# Patient Record
Sex: FEMALE | Race: BLACK OR AFRICAN AMERICAN | NOT HISPANIC OR LATINO
[De-identification: names, ages, dates, MRNs, and addresses within clinical notes are randomized per-mention and may not be internally consistent; named-entity substitution may affect disease eponyms.]

---

## 2017-06-14 ENCOUNTER — TRANSCRIPTION ENCOUNTER (OUTPATIENT)
Age: 47
End: 2017-06-14

## 2018-07-25 ENCOUNTER — EMERGENCY (EMERGENCY)
Facility: HOSPITAL | Age: 48
LOS: 1 days | Discharge: HOME | End: 2018-07-25
Attending: EMERGENCY MEDICINE | Admitting: EMERGENCY MEDICINE
Payer: COMMERCIAL

## 2018-07-25 VITALS
SYSTOLIC BLOOD PRESSURE: 149 MMHG | HEART RATE: 77 BPM | TEMPERATURE: 98 F | OXYGEN SATURATION: 97 % | DIASTOLIC BLOOD PRESSURE: 101 MMHG | RESPIRATION RATE: 18 BRPM

## 2018-07-25 VITALS
TEMPERATURE: 98 F | DIASTOLIC BLOOD PRESSURE: 95 MMHG | OXYGEN SATURATION: 97 % | SYSTOLIC BLOOD PRESSURE: 161 MMHG | WEIGHT: 259.93 LBS | RESPIRATION RATE: 18 BRPM | HEIGHT: 71 IN | HEART RATE: 94 BPM

## 2018-07-25 DIAGNOSIS — R07.89 OTHER CHEST PAIN: ICD-10-CM

## 2018-07-25 DIAGNOSIS — I26.99 OTHER PULMONARY EMBOLISM WITHOUT ACUTE COR PULMONALE: ICD-10-CM

## 2018-07-25 LAB
ALBUMIN SERPL ELPH-MCNC: 4 G/DL — SIGNIFICANT CHANGE UP (ref 3.3–5)
ALP SERPL-CCNC: 57 U/L — SIGNIFICANT CHANGE UP (ref 40–120)
ALT FLD-CCNC: 18 U/L — SIGNIFICANT CHANGE UP (ref 10–45)
ANION GAP SERPL CALC-SCNC: 13 MMOL/L — SIGNIFICANT CHANGE UP (ref 5–17)
APTT BLD: 29.8 SEC — SIGNIFICANT CHANGE UP (ref 27.5–37.4)
AST SERPL-CCNC: 28 U/L — SIGNIFICANT CHANGE UP (ref 10–40)
BASOPHILS NFR BLD AUTO: 1.1 % — SIGNIFICANT CHANGE UP (ref 0–2)
BILIRUB SERPL-MCNC: 0.5 MG/DL — SIGNIFICANT CHANGE UP (ref 0.2–1.2)
BUN SERPL-MCNC: 13 MG/DL — SIGNIFICANT CHANGE UP (ref 7–23)
CALCIUM SERPL-MCNC: 9.6 MG/DL — SIGNIFICANT CHANGE UP (ref 8.4–10.5)
CHLORIDE SERPL-SCNC: 102 MMOL/L — SIGNIFICANT CHANGE UP (ref 96–108)
CK MB CFR SERPL CALC: 1.3 NG/ML — SIGNIFICANT CHANGE UP (ref 0–6.7)
CK SERPL-CCNC: 101 U/L — SIGNIFICANT CHANGE UP (ref 25–170)
CO2 SERPL-SCNC: 26 MMOL/L — SIGNIFICANT CHANGE UP (ref 22–31)
CREAT SERPL-MCNC: 0.75 MG/DL — SIGNIFICANT CHANGE UP (ref 0.5–1.3)
D DIMER BLD IA.RAPID-MCNC: 339 NG/ML DDU — HIGH
EOSINOPHIL NFR BLD AUTO: 4.8 % — SIGNIFICANT CHANGE UP (ref 0–6)
GLUCOSE SERPL-MCNC: 112 MG/DL — HIGH (ref 70–99)
HCT VFR BLD CALC: 40.9 % — SIGNIFICANT CHANGE UP (ref 34.5–45)
HGB BLD-MCNC: 11.9 G/DL — SIGNIFICANT CHANGE UP (ref 11.5–15.5)
INR BLD: 0.98 — SIGNIFICANT CHANGE UP (ref 0.88–1.16)
LYMPHOCYTES # BLD AUTO: 35.1 % — SIGNIFICANT CHANGE UP (ref 13–44)
MCHC RBC-ENTMCNC: 21.8 PG — LOW (ref 27–34)
MCHC RBC-ENTMCNC: 29.1 G/DL — LOW (ref 32–36)
MCV RBC AUTO: 74.9 FL — LOW (ref 80–100)
MONOCYTES NFR BLD AUTO: 12.7 % — SIGNIFICANT CHANGE UP (ref 2–14)
NEUTROPHILS NFR BLD AUTO: 46.3 % — SIGNIFICANT CHANGE UP (ref 43–77)
PLATELET # BLD AUTO: 189 K/UL — SIGNIFICANT CHANGE UP (ref 150–400)
POTASSIUM SERPL-MCNC: 4.1 MMOL/L — SIGNIFICANT CHANGE UP (ref 3.5–5.3)
POTASSIUM SERPL-SCNC: 4.1 MMOL/L — SIGNIFICANT CHANGE UP (ref 3.5–5.3)
PROT SERPL-MCNC: 7.6 G/DL — SIGNIFICANT CHANGE UP (ref 6–8.3)
PROTHROM AB SERPL-ACNC: 10.9 SEC — SIGNIFICANT CHANGE UP (ref 9.8–12.7)
RBC # BLD: 5.46 M/UL — HIGH (ref 3.8–5.2)
RBC # FLD: 17.4 % — HIGH (ref 10.3–16.9)
SODIUM SERPL-SCNC: 141 MMOL/L — SIGNIFICANT CHANGE UP (ref 135–145)
TROPONIN T SERPL-MCNC: <0.01 NG/ML — SIGNIFICANT CHANGE UP (ref 0–0.01)
WBC # BLD: 4.4 K/UL — SIGNIFICANT CHANGE UP (ref 3.8–10.5)
WBC # FLD AUTO: 4.4 K/UL — SIGNIFICANT CHANGE UP (ref 3.8–10.5)

## 2018-07-25 PROCEDURE — 85379 FIBRIN DEGRADATION QUANT: CPT

## 2018-07-25 PROCEDURE — 85025 COMPLETE CBC W/AUTO DIFF WBC: CPT

## 2018-07-25 PROCEDURE — 99284 EMERGENCY DEPT VISIT MOD MDM: CPT | Mod: 25

## 2018-07-25 PROCEDURE — 71275 CT ANGIOGRAPHY CHEST: CPT | Mod: 26

## 2018-07-25 PROCEDURE — 71275 CT ANGIOGRAPHY CHEST: CPT

## 2018-07-25 PROCEDURE — 84484 ASSAY OF TROPONIN QUANT: CPT

## 2018-07-25 PROCEDURE — 85730 THROMBOPLASTIN TIME PARTIAL: CPT

## 2018-07-25 PROCEDURE — 82550 ASSAY OF CK (CPK): CPT

## 2018-07-25 PROCEDURE — 82553 CREATINE MB FRACTION: CPT

## 2018-07-25 PROCEDURE — 85610 PROTHROMBIN TIME: CPT

## 2018-07-25 PROCEDURE — 80053 COMPREHEN METABOLIC PANEL: CPT

## 2018-07-25 PROCEDURE — 99284 EMERGENCY DEPT VISIT MOD MDM: CPT

## 2018-07-25 PROCEDURE — 71275 CT ANGIOGRAPHY CHEST: CPT | Mod: 26,77

## 2018-07-25 RX ORDER — RIVAROXABAN 15 MG-20MG
15 KIT ORAL ONCE
Qty: 0 | Refills: 0 | Status: COMPLETED | OUTPATIENT
Start: 2018-07-25 | End: 2018-07-25

## 2018-07-25 RX ORDER — RIVAROXABAN 15 MG-20MG
1 KIT ORAL
Qty: 42 | Refills: 0
Start: 2018-07-25 | End: 2018-08-14

## 2018-07-25 RX ORDER — FONDAPARINUX SODIUM 2.5 MG/.5ML
1 INJECTION, SOLUTION SUBCUTANEOUS
Qty: 42 | Refills: 0 | OUTPATIENT
Start: 2018-07-25 | End: 2018-08-14

## 2018-07-25 RX ADMIN — RIVAROXABAN 15 MILLIGRAM(S): KIT at 19:57

## 2018-07-25 NOTE — ED ADULT NURSE NOTE - OBJECTIVE STATEMENT
48 y/o female w/ hx of PE 2 years ago, not currently on blood thinners, c/o intermittent left sided anterior chest wall pain that began 2 weeks ago associated w/ SOB. Pt reports SOB only when it is hot out. Pt was prescribed Xarelto for 1 year. Denies fever, chills, cough, palpitations, calf pain, back pain, headache, neck pain, and any other associated symptoms.

## 2018-07-25 NOTE — ED PROVIDER NOTE - PROGRESS NOTE DETAILS
discussed second trop advise, pt does not want to stay for this and understands risk of missed MI, + small peripheral PE, will  initiate xarelto , discussed this plan with Dr. Maya,

## 2018-07-25 NOTE — ED PROVIDER NOTE - MEDICAL DECISION MAKING DETAILS
48 yo with atypical CP, due to ho of PE will CT to evaluate for this , highly doubt ACS given nature of pain and EKG wnl, if CT ok will advise f/u with pcp . highest suspicion pain is musculoskeletal.

## 2018-07-25 NOTE — ED PROVIDER NOTE - OBJECTIVE STATEMENT
chest pain  48 yo with with C.P. for the last 3 days, occasional pinch/ sharp sensation on L chest, lasting a few seconds, felt SOB when it was hot out yesterday, no SOB now and it is not exertional , no palpitations, no radiation of pain, currently pain free, FH of CAD, personal ho of PE 2 yrs ago after knee surgery. Patient called her hematologist Dr. Maya who advised she come to ER for evaluation, no recent travel, no hormones, no trauma

## 2019-02-10 ENCOUNTER — TRANSCRIPTION ENCOUNTER (OUTPATIENT)
Age: 49
End: 2019-02-10

## 2019-06-07 ENCOUNTER — RX RENEWAL (OUTPATIENT)
Age: 49
End: 2019-06-07

## 2019-06-07 PROBLEM — I26.99 OTHER PULMONARY EMBOLISM WITHOUT ACUTE COR PULMONALE: Chronic | Status: ACTIVE | Noted: 2018-07-25

## 2019-06-07 PROBLEM — Z00.00 ENCOUNTER FOR PREVENTIVE HEALTH EXAMINATION: Status: ACTIVE | Noted: 2019-06-07

## 2019-06-07 RX ORDER — HYLAN G-F 20 16MG/2ML
16 SYRINGE (ML) INTRAARTICULAR
Qty: 6 | Refills: 0 | Status: ACTIVE | OUTPATIENT
Start: 2019-06-07

## 2019-06-10 ENCOUNTER — APPOINTMENT (OUTPATIENT)
Dept: ORTHOPEDIC SURGERY | Facility: CLINIC | Age: 49
End: 2019-06-10

## 2019-06-28 ENCOUNTER — OTHER (OUTPATIENT)
Age: 49
End: 2019-06-28

## 2019-07-09 ENCOUNTER — OTHER (OUTPATIENT)
Age: 49
End: 2019-07-09

## 2019-08-07 ENCOUNTER — APPOINTMENT (OUTPATIENT)
Dept: ORTHOPEDIC SURGERY | Facility: CLINIC | Age: 49
End: 2019-08-07
Payer: COMMERCIAL

## 2019-08-07 PROCEDURE — 20611 DRAIN/INJ JOINT/BURSA W/US: CPT | Mod: 50

## 2019-08-07 PROCEDURE — 99204 OFFICE O/P NEW MOD 45 MIN: CPT | Mod: 25

## 2019-08-07 NOTE — PHYSICAL EXAM
[de-identified] : Right knee exam today definite medial joint line tenderness. Range of motion is 0-125°. There is no effusion the knee is stable.\par \par Left knee exam today definite medial joint tenderness range of motion 0-130°. There is no effusion noted today patient is

## 2019-08-12 ENCOUNTER — APPOINTMENT (OUTPATIENT)
Dept: ORTHOPEDIC SURGERY | Facility: CLINIC | Age: 49
End: 2019-08-12
Payer: COMMERCIAL

## 2019-08-12 PROCEDURE — 20611 DRAIN/INJ JOINT/BURSA W/US: CPT | Mod: 50

## 2019-08-12 PROCEDURE — 99214 OFFICE O/P EST MOD 30 MIN: CPT | Mod: 25

## 2019-08-12 NOTE — PHYSICAL EXAM
[de-identified] : Right knee exam today definite medial joint line tenderness. Range of motion is 0-125°. There is no effusion the knee is stable.\par \par Left knee exam today definite medial joint tenderness range of motion 0-130°. There is no effusion noted today patient is

## 2019-08-12 NOTE — PROCEDURE
[de-identified] : Patient was given the second of the series of 3 bilateral Synvisc injections today. Patient tolerated procedure well. Injections were done under sterile conditions and ultrasound guidance.

## 2019-08-19 ENCOUNTER — APPOINTMENT (OUTPATIENT)
Dept: ORTHOPEDIC SURGERY | Facility: CLINIC | Age: 49
End: 2019-08-19
Payer: COMMERCIAL

## 2019-08-19 PROCEDURE — 99214 OFFICE O/P EST MOD 30 MIN: CPT | Mod: 25

## 2019-08-19 PROCEDURE — 20611 DRAIN/INJ JOINT/BURSA W/US: CPT | Mod: 50

## 2019-08-19 NOTE — PROCEDURE
[de-identified] : Patient was given the third of the series of 3 bilateral Synvisc injections today. Patient tolerated procedure well. Injections were done under sterile conditions and ultrasound guidance.

## 2019-08-19 NOTE — PHYSICAL EXAM
[de-identified] : Right knee exam today definite medial joint line tenderness. Range of motion is 0-125°. There is no effusion the knee is stable.\par \par Left knee exam today definite medial joint tenderness range of motion 0-130°. There is no effusion noted today patient is

## 2020-05-04 ENCOUNTER — APPOINTMENT (OUTPATIENT)
Dept: ORTHOPEDIC SURGERY | Facility: CLINIC | Age: 50
End: 2020-05-04

## 2020-05-04 RX ORDER — HYALURONATE SODIUM 30 MG/2 ML
30 SYRINGE (ML) INTRAARTICULAR
Qty: 6 | Refills: 0 | Status: ACTIVE | OUTPATIENT
Start: 2020-05-04

## 2020-05-21 ENCOUNTER — APPOINTMENT (OUTPATIENT)
Dept: ORTHOPEDIC SURGERY | Facility: CLINIC | Age: 50
End: 2020-05-21
Payer: COMMERCIAL

## 2020-05-21 PROCEDURE — 99214 OFFICE O/P EST MOD 30 MIN: CPT | Mod: 25

## 2020-05-21 PROCEDURE — 20611 DRAIN/INJ JOINT/BURSA W/US: CPT | Mod: 50

## 2020-05-21 NOTE — HISTORY OF PRESENT ILLNESS
[de-identified] : pt is here for BL cortisone injections as she waits for arrival of Orthovisc. Pt states shes having pain especially with standing long periods at her job- .

## 2020-05-21 NOTE — PHYSICAL EXAM
[de-identified] : Right knee exam today definite medial joint line tenderness. Range of motion is 0-125°. There is no effusion the knee is stable.\par \par Left knee exam today definite medial joint tenderness range of motion 0-130°. There is no effusion noted today patient is

## 2020-05-21 NOTE — PROCEDURE
[de-identified] : Patient was given a cortisone injection (Lidocaine 1% 4 cc + 10 mg of Kenalog) in the lateral compartment of the BL knee. Injection is performed under sterile conditions with ultrasound guidance. Patient tolerated procedure well. Patient has a history of insulin- dependant diabetes and was informed of possible increase of blood glucose levels and instructed to adjust insulin accordingly.\par \par \par \par

## 2020-07-23 ENCOUNTER — APPOINTMENT (OUTPATIENT)
Dept: ORTHOPEDIC SURGERY | Facility: CLINIC | Age: 50
End: 2020-07-23
Payer: COMMERCIAL

## 2020-07-23 VITALS — TEMPERATURE: 96.9 F

## 2020-07-23 PROCEDURE — 99214 OFFICE O/P EST MOD 30 MIN: CPT | Mod: 25

## 2020-07-23 PROCEDURE — 20611 DRAIN/INJ JOINT/BURSA W/US: CPT | Mod: 50

## 2020-07-23 NOTE — PROCEDURE
[de-identified] : Patient was given the First of a series of 3 Orthovisc injections today. Patient tolerated procedure well. Injections were done under sterile conditions and ultrasound guidance.

## 2020-07-23 NOTE — PHYSICAL EXAM
[de-identified] : Right knee exam today definite medial joint line tenderness. Range of motion is 0-125°. There is no effusion the knee is stable.\par \par Left knee exam today definite medial joint tenderness range of motion 0-130°. There is no effusion noted today patient is

## 2020-07-30 ENCOUNTER — APPOINTMENT (OUTPATIENT)
Dept: ORTHOPEDIC SURGERY | Facility: CLINIC | Age: 50
End: 2020-07-30
Payer: COMMERCIAL

## 2020-07-30 VITALS — TEMPERATURE: 96.1 F

## 2020-07-30 PROCEDURE — 99214 OFFICE O/P EST MOD 30 MIN: CPT | Mod: 25

## 2020-07-30 PROCEDURE — 20611 DRAIN/INJ JOINT/BURSA W/US: CPT | Mod: 50

## 2020-07-30 NOTE — HISTORY OF PRESENT ILLNESS
[de-identified] : Patient returns today she is here for a second of 3 bilateral Orthovisc injections.

## 2020-07-30 NOTE — PHYSICAL EXAM
[de-identified] : Right knee exam today definite medial joint line tenderness. Range of motion is 0-125°. There is no effusion the knee is stable.\par \par Left knee exam today definite medial joint tenderness range of motion 0-130°. There is no effusion noted today patient is

## 2020-07-30 NOTE — PROCEDURE
[de-identified] : Patient was given the second of a series of 3 Orthovisc injections today. Patient tolerated procedure well. Injections were done under sterile conditions and ultrasound guidance.

## 2020-08-06 ENCOUNTER — APPOINTMENT (OUTPATIENT)
Dept: ORTHOPEDIC SURGERY | Facility: CLINIC | Age: 50
End: 2020-08-06
Payer: COMMERCIAL

## 2020-08-06 VITALS — TEMPERATURE: 96.3 F

## 2020-08-06 PROCEDURE — 99214 OFFICE O/P EST MOD 30 MIN: CPT | Mod: 25

## 2020-08-06 PROCEDURE — 20611 DRAIN/INJ JOINT/BURSA W/US: CPT | Mod: 50

## 2020-08-06 NOTE — PHYSICAL EXAM
[de-identified] : Right knee exam today definite medial joint line tenderness. Range of motion is 0-125°. There is no effusion the knee is stable.\par \par Left knee exam today definite medial joint tenderness range of motion 0-130°. There is no effusion noted today patient is

## 2020-08-06 NOTE — HISTORY OF PRESENT ILLNESS
[de-identified] : Patient is here for the third and final bilateral Orthovisc injection today. She'll reveals moderate improvement.

## 2020-08-06 NOTE — PROCEDURE
[de-identified] : Patient was given the third of a series of 3 Orthovisc injections today. Patient tolerated procedure well. Injections were done under sterile conditions and ultrasound guidance.

## 2020-09-21 NOTE — PROCEDURE
[de-identified] : Patient was given the first of the series of 3 bilateral Synvisc injections today. Patient tolerated procedure well. Injections were done under sterile conditions and ultrasound guidance.
Yes

## 2021-02-03 ENCOUNTER — TRANSCRIPTION ENCOUNTER (OUTPATIENT)
Age: 51
End: 2021-02-03

## 2021-02-19 RX ORDER — HYALURONATE SODIUM 30 MG/2 ML
30 SYRINGE (ML) INTRAARTICULAR
Qty: 6 | Refills: 0 | Status: ACTIVE | OUTPATIENT
Start: 2021-02-19

## 2021-02-22 ENCOUNTER — APPOINTMENT (OUTPATIENT)
Dept: ORTHOPEDIC SURGERY | Facility: CLINIC | Age: 51
End: 2021-02-22

## 2021-04-05 ENCOUNTER — APPOINTMENT (OUTPATIENT)
Dept: ORTHOPEDIC SURGERY | Facility: CLINIC | Age: 51
End: 2021-04-05
Payer: COMMERCIAL

## 2021-04-05 PROCEDURE — 99214 OFFICE O/P EST MOD 30 MIN: CPT | Mod: 25

## 2021-04-05 PROCEDURE — 99072 ADDL SUPL MATRL&STAF TM PHE: CPT

## 2021-04-05 PROCEDURE — 20611 DRAIN/INJ JOINT/BURSA W/US: CPT | Mod: 50

## 2021-04-05 RX ORDER — LEVOCETIRIZINE DIHYDROCHLORIDE 5 MG/1
5 TABLET ORAL
Qty: 90 | Refills: 0 | Status: ACTIVE | COMMUNITY
Start: 2021-03-18

## 2021-04-05 RX ORDER — IRBESARTAN 300 MG/1
300 TABLET ORAL
Qty: 90 | Refills: 0 | Status: ACTIVE | COMMUNITY
Start: 2021-03-26

## 2021-04-05 RX ORDER — TRIAMCINOLONE ACETONIDE 1 MG/G
0.1 CREAM TOPICAL
Qty: 80 | Refills: 0 | Status: ACTIVE | COMMUNITY
Start: 2021-03-15

## 2021-04-05 RX ORDER — RIVAROXABAN 20 MG/1
20 TABLET, FILM COATED ORAL
Qty: 90 | Refills: 0 | Status: ACTIVE | COMMUNITY
Start: 2021-01-11

## 2021-04-05 RX ORDER — FLUTICASONE PROPIONATE 50 UG/1
50 SPRAY, METERED NASAL
Qty: 16 | Refills: 0 | Status: ACTIVE | COMMUNITY
Start: 2021-03-18

## 2021-04-05 RX ORDER — HYDROCHLOROTHIAZIDE 25 MG/1
25 TABLET ORAL
Qty: 90 | Refills: 0 | Status: ACTIVE | COMMUNITY
Start: 2021-03-23

## 2021-04-05 RX ORDER — FLUOCINONIDE 0.5 MG/G
0.05 CREAM TOPICAL
Qty: 60 | Refills: 0 | Status: ACTIVE | COMMUNITY
Start: 2020-11-19

## 2021-04-05 RX ORDER — CRISABOROLE 20 MG/G
2 OINTMENT TOPICAL
Qty: 60 | Refills: 0 | Status: ACTIVE | COMMUNITY
Start: 2021-03-24

## 2021-04-05 NOTE — HISTORY OF PRESENT ILLNESS
[de-identified] : Patient is here for the first of 3 bilateral Orthovisc injections. Worked well for her in the past.

## 2021-04-05 NOTE — PROCEDURE
[de-identified] : Patient was given the first of a series of 3 Orthovisc injections today. Patient tolerated procedure well. Injections were done under sterile conditions and ultrasound guidance.

## 2021-04-05 NOTE — PHYSICAL EXAM
[de-identified] : Right knee exam today definite medial joint line tenderness. Range of motion is 0-125°. There is no effusion the knee is stable.\par \par Left knee exam today definite medial joint tenderness range of motion 0-130°. There is no effusion noted today patient is

## 2021-04-19 ENCOUNTER — APPOINTMENT (OUTPATIENT)
Dept: ORTHOPEDIC SURGERY | Facility: CLINIC | Age: 51
End: 2021-04-19
Payer: COMMERCIAL

## 2021-04-19 PROCEDURE — 99214 OFFICE O/P EST MOD 30 MIN: CPT | Mod: 25

## 2021-04-19 PROCEDURE — 20611 DRAIN/INJ JOINT/BURSA W/US: CPT | Mod: 50

## 2021-04-19 PROCEDURE — 99072 ADDL SUPL MATRL&STAF TM PHE: CPT

## 2021-04-19 NOTE — PROCEDURE
[de-identified] : Patient was given the second of a series of 3 Orthovisc injections today. Patient tolerated procedure well. Injections were done under sterile conditions and ultrasound guidance.

## 2021-04-19 NOTE — PHYSICAL EXAM
[de-identified] : Right knee exam today definite medial joint line tenderness. Range of motion is 0-125°. There is no effusion the knee is stable.\par \par Left knee exam today definite medial joint tenderness range of motion 0-130°. There is no effusion noted today patient is

## 2021-04-19 NOTE — HISTORY OF PRESENT ILLNESS
[de-identified] : Patient is here for the second of 3 bilateral Orthovisc injections. She already feels moderate improvement

## 2021-04-26 ENCOUNTER — APPOINTMENT (OUTPATIENT)
Dept: ORTHOPEDIC SURGERY | Facility: CLINIC | Age: 51
End: 2021-04-26
Payer: COMMERCIAL

## 2021-04-26 PROCEDURE — 99214 OFFICE O/P EST MOD 30 MIN: CPT | Mod: 25

## 2021-04-26 PROCEDURE — 20611 DRAIN/INJ JOINT/BURSA W/US: CPT | Mod: 50

## 2021-04-26 PROCEDURE — 99072 ADDL SUPL MATRL&STAF TM PHE: CPT

## 2021-04-26 NOTE — PHYSICAL EXAM
[de-identified] : Right knee exam today definite medial joint line tenderness. Range of motion is 0-125°. There is no effusion the knee is stable.\par \par Left knee exam today definite medial joint tenderness range of motion 0-130°. There is no effusion noted today patient is

## 2021-04-26 NOTE — DISCUSSION/SUMMARY
[de-identified] : pt will return as needed for knee pain. she plans on wanting to re-order injections in 6 months.\par we will revisit this idea when the time gets closer

## 2021-04-26 NOTE — PROCEDURE
[de-identified] : Injection 3/3\par Patient was given an Orthovisc injection 30mg/2ml  in the lateral compartment of the knee. Injection is performed under sterile conditions with ultrasound guidance. Patient tolerated procedure well. \par

## 2021-04-26 NOTE — HISTORY OF PRESENT ILLNESS
[de-identified] : 3/3 orthovisc injections of the BL knee\par Pt is tolerating injections well.  Pt feels good progress since the first injection of the series.  [Stable] : stable

## 2021-05-24 ENCOUNTER — TRANSCRIPTION ENCOUNTER (OUTPATIENT)
Age: 51
End: 2021-05-24

## 2021-09-17 RX ORDER — HYALURONATE SODIUM 30 MG/2 ML
30 SYRINGE (ML) INTRAARTICULAR
Qty: 6 | Refills: 0 | Status: ACTIVE | OUTPATIENT
Start: 2021-09-17

## 2021-10-15 ENCOUNTER — APPOINTMENT (OUTPATIENT)
Dept: ORTHOPEDIC SURGERY | Facility: CLINIC | Age: 51
End: 2021-10-15
Payer: COMMERCIAL

## 2021-10-15 PROCEDURE — 73562 X-RAY EXAM OF KNEE 3: CPT | Mod: 50

## 2021-10-15 PROCEDURE — 99214 OFFICE O/P EST MOD 30 MIN: CPT | Mod: 25

## 2021-10-15 PROCEDURE — 20611 DRAIN/INJ JOINT/BURSA W/US: CPT | Mod: RT

## 2021-10-15 NOTE — PHYSICAL EXAM
[de-identified] : Right knee on exam there is some mild swelling minimal warmth range of motion 0-115°. Knee is stable there is minimal tenderness to medial joint line palpation. [de-identified] : AP standing individual lateral and sunrise views were obtained showing marked diminishment of theand standing AP projection of the right knee. No acute evidence of fracture.

## 2021-10-15 NOTE — DISCUSSION/SUMMARY
[de-identified] : Patient will follow up in 2 weeks time if not thoroughly improved in addition to that patient should be returning in the next few weeks for a renewal of her age injection series

## 2021-10-15 NOTE — HISTORY OF PRESENT ILLNESS
[de-identified] : Well patient returns today with acute onset of right knee pain. Recall she status post bilateral knee arthroscopies and has fairly advanced arthritic changes on radiograph she was doing quite well until this recent episode.

## 2021-10-15 NOTE — PROCEDURE
[de-identified] : Patient was given a cortisone injection to the lateral compartment of right knee today. This is done under sterile conditions and symptoms. Patient tolerated the procedure well.

## 2021-11-05 ENCOUNTER — APPOINTMENT (OUTPATIENT)
Dept: ORTHOPEDIC SURGERY | Facility: CLINIC | Age: 51
End: 2021-11-05
Payer: COMMERCIAL

## 2021-11-05 PROCEDURE — 99214 OFFICE O/P EST MOD 30 MIN: CPT | Mod: 25

## 2021-11-05 PROCEDURE — 20611 DRAIN/INJ JOINT/BURSA W/US: CPT | Mod: 50

## 2021-11-05 NOTE — DISCUSSION/SUMMARY
[de-identified] : We talked at length about the need to consider knee replacement surgery in the future. A reasonable risk and benefits were discussed as well as typical convalescent expectations. Patient will follow up with us as needed

## 2021-11-05 NOTE — PROCEDURE
[de-identified] : Patient was given a cortisone injection (Lidocaine 1% 4 cc + 10 mg of Kenalog) in the lateral compartment of the knee. Injection is performed under sterile conditions with ultrasound guidance. Patient tolerated procedure well. If patient has a history of insulin- dependant diabetes they were informed of possible increase of blood glucose levels and instructed to adjust insulin accordingly.Injection was performed both right and left knee.\par \par

## 2021-11-05 NOTE — HISTORY OF PRESENT ILLNESS
[de-identified] : Patient returns today with resumption of left and right knee pain due to osteoarthritis. She is here for cortisone injection.

## 2021-11-05 NOTE — PHYSICAL EXAM
[de-identified] : Right knee on exam there is some mild swelling minimal warmth range of motion 0-115°. Knee is stable there is minimal tenderness to medial joint line palpation.\par \par Left knee on exam also has significant tenderness at the medial joint line range of motion is 0-110°. Knee is stable to AP stress varus valgus stress there is warmth and soft tissue swelling noted but no effusion.

## 2021-12-10 ENCOUNTER — APPOINTMENT (OUTPATIENT)
Dept: ORTHOPEDIC SURGERY | Facility: CLINIC | Age: 51
End: 2021-12-10
Payer: COMMERCIAL

## 2021-12-10 PROCEDURE — 20611 DRAIN/INJ JOINT/BURSA W/US: CPT | Mod: 50

## 2021-12-10 NOTE — PROCEDURE
[de-identified] : Injection 1/3\par Patient was given an Orthovisc injection 30mg/2ml  in the lateral compartment of the knee. Injection is performed under sterile conditions with ultrasound guidance. Patient tolerated procedure well. \par

## 2021-12-10 NOTE — HISTORY OF PRESENT ILLNESS
[de-identified] : 1/3 orthovisc injections of the BL knee\par Pt is tolerating injections well.  Pt feels good progress since the first injection of the series.

## 2021-12-20 ENCOUNTER — APPOINTMENT (OUTPATIENT)
Dept: ORTHOPEDIC SURGERY | Facility: CLINIC | Age: 51
End: 2021-12-20
Payer: COMMERCIAL

## 2021-12-20 PROCEDURE — 20611 DRAIN/INJ JOINT/BURSA W/US: CPT | Mod: 50

## 2021-12-20 PROCEDURE — 99214 OFFICE O/P EST MOD 30 MIN: CPT | Mod: 25

## 2021-12-20 NOTE — PROCEDURE
[de-identified] : Injection 2/3\par Patient was given an Orthovisc injection 30mg/2ml  in the lateral compartment of the knee. Injection is performed under sterile conditions with ultrasound guidance. Patient tolerated procedure well. \par

## 2021-12-20 NOTE — PROCEDURE
[de-identified] : Injection 2/3\par Patient was given an Orthovisc injection 30mg/2ml  in the lateral compartment of the knee. Injection is performed under sterile conditions with ultrasound guidance. Patient tolerated procedure well. \par

## 2021-12-20 NOTE — HISTORY OF PRESENT ILLNESS
[de-identified] : 2/3 orthovisc injections of the BL knee\par Pt is tolerating injections well.  Pt feels good progress since the first injection of the series.  [Stable] : stable

## 2021-12-20 NOTE — PHYSICAL EXAM
[de-identified] : Right knee exam today definite medial joint line tenderness. Range of motion is 0-125°. There is no effusion the knee is stable.\par \par Left knee exam today definite medial joint tenderness range of motion 0-130°. There is no effusion noted today patient is

## 2021-12-20 NOTE — PHYSICAL EXAM
[de-identified] : Right knee exam today definite medial joint line tenderness. Range of motion is 0-125°. There is no effusion the knee is stable.\par \par Left knee exam today definite medial joint tenderness range of motion 0-130°. There is no effusion noted today patient is

## 2021-12-20 NOTE — HISTORY OF PRESENT ILLNESS
[de-identified] : 2/3 orthovisc injections of the BL knee\par Pt is tolerating injections well.  Pt feels good progress since the first injection of the series.  [Stable] : stable

## 2022-01-03 ENCOUNTER — APPOINTMENT (OUTPATIENT)
Dept: ORTHOPEDIC SURGERY | Facility: CLINIC | Age: 52
End: 2022-01-03

## 2022-01-03 RX ORDER — ACETAMINOPHEN EXTRA STRENGTH 500 MG/1
500 TABLET ORAL
Qty: 30 | Refills: 0 | Status: ACTIVE | COMMUNITY
Start: 2021-10-11

## 2022-01-03 RX ORDER — POLYETHYLENE GLYCOL-3350 AND ELECTROLYTES WITH FLAVOR PACK 240; 5.84; 2.98; 6.72; 22.72 G/278.26G; G/278.26G; G/278.26G; G/278.26G; G/278.26G
240 POWDER, FOR SOLUTION ORAL
Qty: 4000 | Refills: 0 | Status: ACTIVE | COMMUNITY
Start: 2021-10-05

## 2022-02-25 ENCOUNTER — NON-APPOINTMENT (OUTPATIENT)
Age: 52
End: 2022-02-25

## 2022-04-26 ENCOUNTER — APPOINTMENT (OUTPATIENT)
Age: 52
End: 2022-04-26
Payer: COMMERCIAL

## 2022-04-26 VITALS
DIASTOLIC BLOOD PRESSURE: 95 MMHG | BODY MASS INDEX: 36.4 KG/M2 | HEIGHT: 71 IN | WEIGHT: 260 LBS | SYSTOLIC BLOOD PRESSURE: 150 MMHG | HEART RATE: 91 BPM | TEMPERATURE: 97.2 F | RESPIRATION RATE: 15 BRPM | OXYGEN SATURATION: 98 %

## 2022-04-26 DIAGNOSIS — K76.9 LIVER DISEASE, UNSPECIFIED: ICD-10-CM

## 2022-04-26 PROCEDURE — 99204 OFFICE O/P NEW MOD 45 MIN: CPT

## 2022-04-26 NOTE — ASSESSMENT
[FreeTextEntry1] : 51F w/ PMHx of PE on Xarelto here for evaluation of liver lesions.  \par \par #Liver Lesion\par Likely hemangioma from ultrasound.  Will confirm with MRI.  Given size >10 cm, and may consider role of surgical intervention.  Patient would like to hold off surgical evaluation at this time  \par -MRI w/wo contrast\par -Continue monitoring symptoms\par \par RTC prn\par \par Lopez Guillen MD\par GI Fellow\par

## 2022-04-26 NOTE — HISTORY OF PRESENT ILLNESS
[FreeTextEntry1] : 51F w/ PMHx of PE on Xarelto here for evaluation of liver lesions.  \par Referred Dr Mullins for liver lesion\par \par Have mild dull ache on the right side of the abd. \par Having 2-3 BM per day, denies melena or hematochezia.\par Denies fever, chill, cp, sob, abd pain, nausea, vomiting, diarrhea, constipation, bloating, belching, wt loss or loss of appetite, jaundice, confusion, pruritus.\par \par ALL: NKDA\par Med: Xarelto\par Med Hx: see above\par Surg hx: Hysterectomy\par SocHx: Denies smoking, or drug use. Social alcohol use, around 1 glass of wine per week\par Fam hx: Denies fam hx of CRC, IBD, or large polyps, liver cancer, liver disease\par \par Imaging: US 1/24/22 heterogeneous solid mass right hepatic lobe measuring 9.2 x 12.4 cm and increasing size of the echogeneic mass left hepatic lobe measuring 6.2 x 6.7 cm, new echogenic nodule left hepatic lobe measuring 0.7 x 0.7 cm prior imagin in 2017, with increase from 9.2 x 6.7 x 8.7 to 9.2 x 9 x 12.4 cm\par EGD: No prior\par Colonoscopy: 10/21 w one polyp, per pt\par \par Occupation: School safety \par

## 2022-04-26 NOTE — PHYSICAL EXAM
[General Appearance - Alert] : alert [General Appearance - In No Acute Distress] : in no acute distress [Sclera] : the sclera and conjunctiva were normal [Extraocular Movements] : extraocular movements were intact [] : no respiratory distress [Exaggerated Use Of Accessory Muscles For Inspiration] : no accessory muscle use [Bowel Sounds] : normal bowel sounds [Abdomen Soft] : soft [Abdomen Tenderness] : non-tender [Abnormal Walk] : normal gait [Musculoskeletal - Swelling] : no joint swelling seen [Skin Color & Pigmentation] : normal skin color and pigmentation [Skin Turgor] : normal skin turgor [Cranial Nerves] : cranial nerves 2-12 were intact [Deep Tendon Reflexes (DTR)] : deep tendon reflexes were 2+ and symmetric [Oriented To Time, Place, And Person] : oriented to person, place, and time [Impaired Insight] : insight and judgment were intact [FreeTextEntry1] : liver edge palpable 1 cm below the right rib

## 2022-05-12 ENCOUNTER — APPOINTMENT (OUTPATIENT)
Dept: ORTHOPEDIC SURGERY | Facility: CLINIC | Age: 52
End: 2022-05-12
Payer: COMMERCIAL

## 2022-05-12 VITALS
HEART RATE: 79 BPM | BODY MASS INDEX: 37.1 KG/M2 | OXYGEN SATURATION: 98 % | RESPIRATION RATE: 17 BRPM | SYSTOLIC BLOOD PRESSURE: 149 MMHG | DIASTOLIC BLOOD PRESSURE: 96 MMHG | TEMPERATURE: 98 F | WEIGHT: 265 LBS | HEIGHT: 71 IN

## 2022-05-12 PROCEDURE — 99214 OFFICE O/P EST MOD 30 MIN: CPT | Mod: 25

## 2022-05-12 PROCEDURE — 20611 DRAIN/INJ JOINT/BURSA W/US: CPT | Mod: 1L,50

## 2022-05-12 RX ORDER — HYALURONATE SODIUM, STABILIZED 88 MG/4 ML
88 SYRINGE (ML) INTRAARTICULAR
Qty: 2 | Refills: 0 | Status: ACTIVE | OUTPATIENT
Start: 2022-05-12

## 2022-06-22 NOTE — PHYSICAL EXAM
[de-identified] : Right knee on exam there is some mild swelling minimal warmth range of motion 0-115°. Knee is stable there is minimal tenderness to medial joint line palpation.\par \par Left knee on exam also has significant tenderness at the medial joint line range of motion is 0-110°. Knee is stable to AP stress varus valgus stress there is warmth and soft tissue swelling noted but no effusion.

## 2022-06-22 NOTE — HISTORY OF PRESENT ILLNESS
[de-identified] : Patient presents for third and final bilateral Orthovisc injection today. Patient states she's noticed moderate improvement of her symptoms.

## 2022-06-22 NOTE — PROCEDURE
[de-identified] : \par \par Patient was given a third of 3 bilateral Orthovisc injections today. This was done and sterile conditions and ultrasound guidance. Patient tolerated the procedure well.

## 2022-06-24 ENCOUNTER — APPOINTMENT (OUTPATIENT)
Dept: ORTHOPEDIC SURGERY | Facility: CLINIC | Age: 52
End: 2022-06-24

## 2022-06-24 PROCEDURE — 20610 DRAIN/INJ JOINT/BURSA W/O US: CPT | Mod: 50

## 2022-07-08 ENCOUNTER — APPOINTMENT (OUTPATIENT)
Dept: ORTHOPEDIC SURGERY | Facility: CLINIC | Age: 52
End: 2022-07-08

## 2022-07-08 PROCEDURE — 20611 DRAIN/INJ JOINT/BURSA W/US: CPT | Mod: 50

## 2022-07-08 NOTE — PROCEDURE
[de-identified] : Patient was given the second of 3 bilateral Orthovisc injections to the lateral joint line of each knee today.  This was done under sterile conditions and ultrasound guidance.  Patient tolerated the injections well.

## 2022-07-08 NOTE — PHYSICAL EXAM
[de-identified] : Right knee on exam there is some mild swelling minimal warmth range of motion 0-115°. Knee is stable there is minimal tenderness to medial joint line palpation.\par \par Left knee on exam also has significant tenderness at the medial joint line range of motion is 0-110°. Knee is stable to AP stress varus valgus stress there is warmth and soft tissue swelling noted but no effusion.

## 2022-07-08 NOTE — DISCUSSION/SUMMARY
[de-identified] : Patient will ice the knees tonight monitor her symptoms follow-up next week for the final injection.

## 2022-07-09 ENCOUNTER — APPOINTMENT (OUTPATIENT)
Dept: MRI IMAGING | Facility: HOSPITAL | Age: 52
End: 2022-07-09

## 2022-07-09 ENCOUNTER — OUTPATIENT (OUTPATIENT)
Dept: OUTPATIENT SERVICES | Facility: HOSPITAL | Age: 52
LOS: 1 days | End: 2022-07-09
Payer: COMMERCIAL

## 2022-07-09 PROCEDURE — A9585: CPT

## 2022-07-09 PROCEDURE — 74183 MRI ABD W/O CNTR FLWD CNTR: CPT | Mod: 26

## 2022-07-09 PROCEDURE — 74183 MRI ABD W/O CNTR FLWD CNTR: CPT

## 2022-07-15 ENCOUNTER — APPOINTMENT (OUTPATIENT)
Dept: ORTHOPEDIC SURGERY | Facility: CLINIC | Age: 52
End: 2022-07-15

## 2022-07-15 PROCEDURE — 20611 DRAIN/INJ JOINT/BURSA W/US: CPT | Mod: 50

## 2022-07-15 NOTE — PROCEDURE
[de-identified] : Patient was given the third of 3 bilateral Orthovisc injections to the lateral joint line of each knee today.  This was done under sterile conditions and ultrasound guidance.  Patient tolerated the injections well.

## 2022-07-15 NOTE — PHYSICAL EXAM
[de-identified] : Right knee on exam there is some mild swelling minimal warmth range of motion 0-115°. Knee is stable there is minimal tenderness to medial joint line palpation.\par \par Left knee on exam also has significant tenderness at the medial joint line range of motion is 0-110°. Knee is stable to AP stress varus valgus stress there is warmth and soft tissue swelling noted but no effusion.

## 2022-07-15 NOTE — HISTORY OF PRESENT ILLNESS
[de-identified] : Patient returns today for the third and final bilateral HA injection.  Patient already feels improvement.

## 2022-07-15 NOTE — DISCUSSION/SUMMARY
[de-identified] : Patient will ice the knees here in the office again tonight if symptomatic.  Patient will monitor her symptoms follow-up as needed.

## 2022-08-29 ENCOUNTER — APPOINTMENT (OUTPATIENT)
Dept: ORTHOPEDIC SURGERY | Facility: CLINIC | Age: 52
End: 2022-08-29

## 2022-08-29 VITALS — HEIGHT: 71 IN | BODY MASS INDEX: 35 KG/M2 | WEIGHT: 250 LBS

## 2022-08-29 PROCEDURE — 99213 OFFICE O/P EST LOW 20 MIN: CPT | Mod: 25

## 2022-08-29 PROCEDURE — 20611 DRAIN/INJ JOINT/BURSA W/US: CPT | Mod: 50

## 2022-09-12 ENCOUNTER — APPOINTMENT (OUTPATIENT)
Dept: ORTHOPEDIC SURGERY | Facility: CLINIC | Age: 52
End: 2022-09-12

## 2022-09-12 VITALS — HEIGHT: 71 IN | BODY MASS INDEX: 36.4 KG/M2 | WEIGHT: 260 LBS

## 2022-09-12 PROCEDURE — 99214 OFFICE O/P EST MOD 30 MIN: CPT

## 2022-09-12 NOTE — REASON FOR VISIT
[Follow-Up Visit] : a follow-up visit for [Knee Pain] : knee pain [FreeTextEntry2] : Bertin knee pain. Rt knee is worse. Pt is here to have paperwork filled out.

## 2022-09-12 NOTE — PHYSICAL EXAM
[de-identified] : Right knee on exam there is some mild swelling minimal warmth range of motion 0-115°. Knee is stable there is minimal tenderness to medial joint line palpation.\par \par Left knee on exam also has significant tenderness at the medial joint line range of motion is 0-110°. Knee is stable to AP stress varus valgus stress there is warmth and soft tissue swelling noted but no effusion.

## 2022-09-12 NOTE — DISCUSSION/SUMMARY
[de-identified] : Surgical risks reviewed. The reasonable risks and benefits of knee replacement surgery discussed in detail with the patient. Patient asked appropriate questions which were answered to their satisfaction. We talked about potential complications including complications they may require revision surgery such as component loosening failure migration wear and also potential complications of infection and stiffness.\par \par Patient has a more symptomatic right knee that will be addressed first.  On clinical exam and radiographs and also has a greater degree of varus inclination with secondary findings of osteophyte and cyst formation.  She has bone-on-bone contact.  Patient is relatively young I have advised her to do the best that she can to reduce her BMI in an effort to reduce perioperative complications as well as to ensure maximum implant longevity.  Patient is already spoken to her primary care physician for potentially using Ozempic.  She will follow-up as needed.

## 2022-09-12 NOTE — HISTORY OF PRESENT ILLNESS
[de-identified] : Patient returns today she states she is having worsening right knee medial pain she knows she has fairly advanced osteoarthritis she is considering knee replacement surgery sometime in the fall or winter.  Patient is here basically to be advised on the process of having the knee replacement surgery and to review typical convalescence expectations.

## 2022-09-23 NOTE — PROCEDURE
[de-identified] : Injection 3/3\par Patient was given an Orthovisc injection 30mg/2ml  in the lateral compartment of the knee. Injection is performed under sterile conditions with ultrasound guidance. Patient tolerated procedure well. \par  \par Manufacture : [employer]\par Drug name : Orthovisc\par NDC #  9926217-5720-28\par Lot #  [id number]\par Dosage  2ml 15mg/ml\par Expiration date  2024-06-29\par \par

## 2022-09-23 NOTE — PHYSICAL EXAM
[de-identified] : Right knee exam today definite medial joint line tenderness. Range of motion is 0-125°. There is no effusion the knee is stable.\par \par Left knee exam today definite medial joint tenderness range of motion 0-130°. There is no effusion noted today patient is

## 2022-10-05 NOTE — ED PROVIDER NOTE - CPE EDP ENMT NORM
Blood collected with IV start, labeled and walked to lab for processing.       Everardo Stephens RN  10/05/22 7276
Urine collected, labeled and walked to lab for processing.           Cristo Sullivan RN  10/05/22 [id number]
normal...

## 2022-10-23 NOTE — REASON FOR VISIT
[Follow-Up Visit] : a follow-up visit for [Knee Pain] : knee pain [FreeTextEntry2] : Bertin knee cortisone inj, and paperwork

## 2022-10-23 NOTE — DISCUSSION/SUMMARY
[de-identified] : We had a long discussion about trying to reduce her BMI we also spoke specifically about using Ozempic as a medication to help reduce her BMI before any kind of surgery.  Significant weight loss may also in addition to reduce perioperative complications have an effect of putting off surgery due to less weight through the knees.  Patient will consider this with her primary care physician.

## 2022-10-23 NOTE — PHYSICAL EXAM
[de-identified] : Right knee on exam there is some mild swelling minimal warmth range of motion 0-115°. Knee is stable there is minimal tenderness to medial joint line palpation.\par \par Left knee on exam also has significant tenderness at the medial joint line range of motion is 0-110°. Knee is stable to AP stress varus valgus stress there is warmth and soft tissue swelling noted but no effusion.

## 2022-10-23 NOTE — HISTORY OF PRESENT ILLNESS
[de-identified] : Patient returns today with resumption of bilateral medial knee pain.  She has a well-known diagnosis of severe osteoarthritis of both knees.  She is doing everything she can to avoid surgery at this point because of increase in symptoms she is here to consider bilateral cortisone injections.

## 2022-10-23 NOTE — PHYSICAL EXAM
[de-identified] : Right knee on exam there is some mild swelling minimal warmth range of motion 0-115°. Knee is stable there is minimal tenderness to medial joint line palpation.\par \par Left knee on exam also has significant tenderness at the medial joint line range of motion is 0-110°. Knee is stable to AP stress varus valgus stress there is warmth and soft tissue swelling noted but no effusion.

## 2022-10-23 NOTE — PROCEDURE
[de-identified] : Patient was given a cortisone injection (Lidocaine 1% 4 cc + 10 mg of Kenalog) in the lateral compartment of the right and left knee. Injection is performed under sterile conditions with ultrasound guidance. Patient tolerated procedure well. If patient has a history of insulin- dependant diabetes they were informed of possible increase of blood glucose levels and instructed to adjust insulin accordingly.\par \par Manufacture [employer]\par Drug name  LIDOCAINE\par NDC #  1042-5686-40\par Lot #  [id number]\par Expiration date   05/2023\par \par Manufacture  [employer]\par Drug name  KENALOG\par NDC #  [id number]\par Lot #  [id number]\par Expiration date  OCT 2023\par \par

## 2022-10-23 NOTE — HISTORY OF PRESENT ILLNESS
[de-identified] : Patient returns today with resumption of bilateral knee pain.  Patient has a history of significant bilateral advanced arthritis of the medial compartment.  She is looking forward to undergoing knee replacement surgery in the next few months may be in the fall but for now would like to have cortisone injections today and repeat HA injections.

## 2022-10-23 NOTE — PROCEDURE
[de-identified] : Patient was given a cortisone injection (Lidocaine 1% 4 cc + 10 mg of Kenalog) in the lateral compartment of the right and left knee. Injection is performed under sterile conditions with ultrasound guidance. Patient tolerated procedure well. If patient has a history of insulin- dependant diabetes they were informed of possible increase of blood glucose levels and instructed to adjust insulin accordingly.\par \par Manufacture [employer]\par Drug name  LIDOCAINE\par NDC #  6351-5755-50\par Lot #  [id number]\par Expiration date   05/2023\par \par Manufacture  [employer]\par Drug name  KENALOG\par NDC #  6901-7974-57\par Lot #  [id number]\par Expiration date  OCT 2023\par \par

## 2022-10-23 NOTE — DISCUSSION/SUMMARY
[de-identified] : We had a lengthy conversation today about knee replacement surgery.  We talked about potential complications and typical convalescence and expectations.  Patient will consider this option possibly in the fall for now we will order bilateral single dose HA injections for both knees notify the patient once they are available for use.

## 2022-11-04 ENCOUNTER — APPOINTMENT (OUTPATIENT)
Dept: ORTHOPEDIC SURGERY | Facility: CLINIC | Age: 52
End: 2022-11-04

## 2022-11-04 PROCEDURE — 99214 OFFICE O/P EST MOD 30 MIN: CPT | Mod: 25

## 2022-11-04 PROCEDURE — 73562 X-RAY EXAM OF KNEE 3: CPT | Mod: 50

## 2022-11-04 NOTE — PHYSICAL EXAM
[de-identified] : Right knee on exam there is some mild swelling minimal warmth range of motion 0-115°. Knee is stable there is minimal tenderness to medial joint line palpation.\par \par Left knee on exam also has significant tenderness at the medial joint line range of motion is 0-110°. Knee is stable to AP stress varus valgus stress there is warmth and soft tissue swelling noted but no effusion.  Left knee also is noted to have a moderate amount of  varus angulation approximately 1 degrees. [de-identified] : Knee radiographs were ordered today.  AP standing lateral sunrise views were obtained showing complete bone loss on the medial aspect of the lateral compartment 12 to 15 degrees of varus inclination.  Patient has early secondary findings of osteoarthritis and medial compartment such as osteophyte and cyst formation as well.

## 2022-11-04 NOTE — DISCUSSION/SUMMARY
[de-identified] : Surgical risks reviewed. We talked about potential complications of surgery.  We talked about potential complications which may require revision surgery such as component migration wear, loosening, infection, instability and leg length equality.\par \par We talked about patient weight loss prior to surgery in order help make her recovery even easier.  She is currently at a BMI of 36.  About typical convalescence expectations, surgical approach, implant selection criteria and typical expectations of implant longevity.\par \par Plan.  Patient will consider knee replacement surgery next 2 to 3 months.\par

## 2022-11-04 NOTE — HISTORY OF PRESENT ILLNESS
[de-identified] : Patient returns today she has an established diagnosis of bilateral knee osteoarthritis.  Has significant worsening symptoms in her left failed conservative measures that of afforded her some significant improvement last few years but the pain is gotten better she is here to discuss knee replacement surgery of the left.

## 2022-11-04 NOTE — REASON FOR VISIT
[Follow-Up Visit] : a follow-up visit for [Knee Pain] : knee pain [FreeTextEntry2] : MESHA knee pain.

## 2022-12-12 ENCOUNTER — LABORATORY RESULT (OUTPATIENT)
Age: 52
End: 2022-12-12

## 2022-12-13 ENCOUNTER — TRANSCRIPTION ENCOUNTER (OUTPATIENT)
Age: 52
End: 2022-12-13

## 2022-12-13 VITALS
DIASTOLIC BLOOD PRESSURE: 90 MMHG | WEIGHT: 263.89 LBS | HEIGHT: 71 IN | TEMPERATURE: 99 F | SYSTOLIC BLOOD PRESSURE: 142 MMHG | HEART RATE: 87 BPM | OXYGEN SATURATION: 96 % | RESPIRATION RATE: 16 BRPM

## 2022-12-13 RX ORDER — CHLORHEXIDINE GLUCONATE 213 G/1000ML
1 SOLUTION TOPICAL DAILY
Refills: 0 | Status: DISCONTINUED | OUTPATIENT
Start: 2022-12-14 | End: 2022-12-14

## 2022-12-13 RX ORDER — POVIDONE-IODINE 5 %
1 AEROSOL (ML) TOPICAL ONCE
Refills: 0 | Status: COMPLETED | OUTPATIENT
Start: 2022-12-14 | End: 2022-12-14

## 2022-12-13 NOTE — ASU PATIENT PROFILE, ADULT - NSICDXPASTMEDICALHX_GEN_ALL_CORE_FT
PAST MEDICAL HISTORY:  GERD (gastroesophageal reflux disease)     History of thyroid nodule     HTN (hypertension)     Pulmonary embolism

## 2022-12-13 NOTE — ASU PATIENT PROFILE, ADULT - NSICDXPASTSURGICALHX_GEN_ALL_CORE_FT
PAST SURGICAL HISTORY:  Elective surgery 2 tears in right and left knee that were repaired    S/P hysterectomy

## 2022-12-13 NOTE — H&P ADULT - NSHPPHYSICALEXAM_GEN_ALL_CORE
PE:  Decreased ROM secondary to pain. Rest of PE per medical clearance. MSK: Decreased ROM 2/2 pain, left knee   Skin warm and well perfused, no visible wounds/erythema/ecchymoses  EHL/FHL/TA/GS 5/5 motor strength bilateral lower extremities   SLT in tact to distal bilateral lower extremities   DP pulses palpable bilaterally     Remainder of exam per medical clearance note

## 2022-12-13 NOTE — H&P ADULT - PROBLEM SELECTOR PLAN 1
Admit to Orthopaedic Service.  Presents today for elective LEFT TKR.   Pt medically stable and cleared for procedure today by Dr. Mullins and Dr. Maya.

## 2022-12-13 NOTE — H&P ADULT - NSHPLABSRESULTS_GEN_ALL_CORE
preop cbc/bmp/coags/ua (positive UTI treated with macrobid 100bid x 5d repeat UA negative) wnl per medical clearance   Preop EKG wnl per clearance   Preop chest x-ray wnl per clearance   Cr 0.74  covid pending

## 2022-12-13 NOTE — H&P ADULT - HISTORY OF PRESENT ILLNESS
51yo f c/o left knee pain x   Presents today for elective LEFT TKR.  51yo f c/o left knee pain x chronic. Pt denies acute preceding trauma/injury. Pt states her left knee pain is localized; she takes tylenol as needed for pain control. Denies numbness/tingling/weakness of bilateral lower extremities. She does not ambulate with an assistive device at baseline. Pt has hx of unprovoked DVT/PE in 2016, she is currently on Xarelto, last dose on Saturday 12/10/22. She denies CP, SOB, N/V, tactile fevers, calf pain.     Presents today for elective LEFT TKR.

## 2022-12-14 ENCOUNTER — INPATIENT (INPATIENT)
Facility: HOSPITAL | Age: 52
LOS: 0 days | Discharge: HOME | DRG: 470 | End: 2022-12-15
Attending: SPECIALIST | Admitting: SPECIALIST
Payer: COMMERCIAL

## 2022-12-14 ENCOUNTER — APPOINTMENT (OUTPATIENT)
Dept: ORTHOPEDIC SURGERY | Facility: HOSPITAL | Age: 52
End: 2022-12-14

## 2022-12-14 ENCOUNTER — RESULT REVIEW (OUTPATIENT)
Age: 52
End: 2022-12-14

## 2022-12-14 DIAGNOSIS — Z41.9 ENCOUNTER FOR PROCEDURE FOR PURPOSES OTHER THAN REMEDYING HEALTH STATE, UNSPECIFIED: Chronic | ICD-10-CM

## 2022-12-14 DIAGNOSIS — M19.90 UNSPECIFIED OSTEOARTHRITIS, UNSPECIFIED SITE: ICD-10-CM

## 2022-12-14 DIAGNOSIS — I26.99 OTHER PULMONARY EMBOLISM WITHOUT ACUTE COR PULMONALE: ICD-10-CM

## 2022-12-14 DIAGNOSIS — Z90.710 ACQUIRED ABSENCE OF BOTH CERVIX AND UTERUS: Chronic | ICD-10-CM

## 2022-12-14 PROCEDURE — 73560 X-RAY EXAM OF KNEE 1 OR 2: CPT | Mod: 26,LT

## 2022-12-14 PROCEDURE — 27447 TOTAL KNEE ARTHROPLASTY: CPT | Mod: AS,LT

## 2022-12-14 PROCEDURE — 27447 TOTAL KNEE ARTHROPLASTY: CPT | Mod: LT

## 2022-12-14 DEVICE — CEMENT PALACOS R: Type: IMPLANTABLE DEVICE | Site: LEFT KNEE | Status: FUNCTIONAL

## 2022-12-14 DEVICE — PIN TROCAR GEN 1/8 X 3IN: Type: IMPLANTABLE DEVICE | Site: LEFT KNEE | Status: FUNCTIONAL

## 2022-12-14 DEVICE — IMPLANTABLE DEVICE: Type: IMPLANTABLE DEVICE | Site: LEFT KNEE | Status: FUNCTIONAL

## 2022-12-14 DEVICE — FEM COMP JRNY II BCS BICRUCIATE LT SZ 5: Type: IMPLANTABLE DEVICE | Site: LEFT KNEE | Status: FUNCTIONAL

## 2022-12-14 DEVICE — BASEPLATE TIB JRNY NP SZ 4 LT: Type: IMPLANTABLE DEVICE | Site: LEFT KNEE | Status: FUNCTIONAL

## 2022-12-14 DEVICE — PATELLA 35MM JOURNEY 7.5 RND RSRF: Type: IMPLANTABLE DEVICE | Site: LEFT KNEE | Status: FUNCTIONAL

## 2022-12-14 RX ORDER — ACETAMINOPHEN 500 MG
975 TABLET ORAL EVERY 8 HOURS
Refills: 0 | Status: DISCONTINUED | OUTPATIENT
Start: 2022-12-14 | End: 2022-12-15

## 2022-12-14 RX ORDER — OXYCODONE HYDROCHLORIDE 5 MG/1
5 TABLET ORAL
Refills: 0 | Status: DISCONTINUED | OUTPATIENT
Start: 2022-12-14 | End: 2022-12-15

## 2022-12-14 RX ORDER — HYDROMORPHONE HYDROCHLORIDE 2 MG/ML
0.5 INJECTION INTRAMUSCULAR; INTRAVENOUS; SUBCUTANEOUS EVERY 4 HOURS
Refills: 0 | Status: DISCONTINUED | OUTPATIENT
Start: 2022-12-14 | End: 2022-12-15

## 2022-12-14 RX ORDER — ONDANSETRON 8 MG/1
4 TABLET, FILM COATED ORAL EVERY 6 HOURS
Refills: 0 | Status: DISCONTINUED | OUTPATIENT
Start: 2022-12-14 | End: 2022-12-15

## 2022-12-14 RX ORDER — CEFAZOLIN SODIUM 1 G
2000 VIAL (EA) INJECTION EVERY 8 HOURS
Refills: 0 | Status: COMPLETED | OUTPATIENT
Start: 2022-12-15 | End: 2022-12-15

## 2022-12-14 RX ORDER — ASPIRIN/CALCIUM CARB/MAGNESIUM 324 MG
325 TABLET ORAL DAILY
Refills: 0 | Status: DISCONTINUED | OUTPATIENT
Start: 2022-12-15 | End: 2022-12-15

## 2022-12-14 RX ORDER — POLYETHYLENE GLYCOL 3350 17 G/17G
17 POWDER, FOR SOLUTION ORAL AT BEDTIME
Refills: 0 | Status: DISCONTINUED | OUTPATIENT
Start: 2022-12-14 | End: 2022-12-15

## 2022-12-14 RX ORDER — PANTOPRAZOLE SODIUM 20 MG/1
40 TABLET, DELAYED RELEASE ORAL
Refills: 0 | Status: DISCONTINUED | OUTPATIENT
Start: 2022-12-14 | End: 2022-12-15

## 2022-12-14 RX ORDER — KETOROLAC TROMETHAMINE 30 MG/ML
15 SYRINGE (ML) INJECTION EVERY 6 HOURS
Refills: 0 | Status: DISCONTINUED | OUTPATIENT
Start: 2022-12-14 | End: 2022-12-15

## 2022-12-14 RX ORDER — OXYCODONE HYDROCHLORIDE 5 MG/1
20 TABLET ORAL ONCE
Refills: 0 | Status: DISCONTINUED | OUTPATIENT
Start: 2022-12-14 | End: 2022-12-14

## 2022-12-14 RX ORDER — CELECOXIB 200 MG/1
200 CAPSULE ORAL EVERY 12 HOURS
Refills: 0 | Status: DISCONTINUED | OUTPATIENT
Start: 2022-12-15 | End: 2022-12-15

## 2022-12-14 RX ORDER — SODIUM CHLORIDE 9 MG/ML
1000 INJECTION, SOLUTION INTRAVENOUS
Refills: 0 | Status: DISCONTINUED | OUTPATIENT
Start: 2022-12-14 | End: 2022-12-15

## 2022-12-14 RX ORDER — HYDROMORPHONE HYDROCHLORIDE 2 MG/ML
0.5 INJECTION INTRAMUSCULAR; INTRAVENOUS; SUBCUTANEOUS EVERY 4 HOURS
Refills: 0 | Status: COMPLETED | OUTPATIENT
Start: 2022-12-14 | End: 2022-12-21

## 2022-12-14 RX ORDER — CELECOXIB 200 MG/1
400 CAPSULE ORAL ONCE
Refills: 0 | Status: COMPLETED | OUTPATIENT
Start: 2022-12-14 | End: 2022-12-14

## 2022-12-14 RX ORDER — OXYCODONE HYDROCHLORIDE 5 MG/1
10 TABLET ORAL
Refills: 0 | Status: DISCONTINUED | OUTPATIENT
Start: 2022-12-14 | End: 2022-12-15

## 2022-12-14 RX ORDER — MAGNESIUM HYDROXIDE 400 MG/1
30 TABLET, CHEWABLE ORAL DAILY
Refills: 0 | Status: DISCONTINUED | OUTPATIENT
Start: 2022-12-14 | End: 2022-12-15

## 2022-12-14 RX ORDER — SENNA PLUS 8.6 MG/1
2 TABLET ORAL AT BEDTIME
Refills: 0 | Status: DISCONTINUED | OUTPATIENT
Start: 2022-12-14 | End: 2022-12-15

## 2022-12-14 RX ADMIN — Medication 15 MILLIGRAM(S): at 23:31

## 2022-12-14 RX ADMIN — CHLORHEXIDINE GLUCONATE 1 APPLICATION(S): 213 SOLUTION TOPICAL at 11:14

## 2022-12-14 RX ADMIN — CELECOXIB 400 MILLIGRAM(S): 200 CAPSULE ORAL at 11:18

## 2022-12-14 RX ADMIN — Medication 1 APPLICATION(S): at 11:24

## 2022-12-14 RX ADMIN — Medication 975 MILLIGRAM(S): at 21:11

## 2022-12-14 RX ADMIN — SENNA PLUS 2 TABLET(S): 8.6 TABLET ORAL at 22:25

## 2022-12-14 RX ADMIN — HYDROMORPHONE HYDROCHLORIDE 0.5 MILLIGRAM(S): 2 INJECTION INTRAMUSCULAR; INTRAVENOUS; SUBCUTANEOUS at 23:10

## 2022-12-14 RX ADMIN — OXYCODONE HYDROCHLORIDE 20 MILLIGRAM(S): 5 TABLET ORAL at 11:18

## 2022-12-14 RX ADMIN — HYDROMORPHONE HYDROCHLORIDE 0.5 MILLIGRAM(S): 2 INJECTION INTRAMUSCULAR; INTRAVENOUS; SUBCUTANEOUS at 23:40

## 2022-12-14 RX ADMIN — Medication 15 MILLIGRAM(S): at 23:01

## 2022-12-14 RX ADMIN — POLYETHYLENE GLYCOL 3350 17 GRAM(S): 17 POWDER, FOR SOLUTION ORAL at 22:25

## 2022-12-14 RX ADMIN — OXYCODONE HYDROCHLORIDE 10 MILLIGRAM(S): 5 TABLET ORAL at 21:11

## 2022-12-14 NOTE — PRE-ANESTHESIA EVALUATION ADULT - NSANTHPEFT_GEN_ALL_CORE
General: AAOx3, NAD  Eyes: The sclera and conjunctiva normal, pupils equal in size.  ENT: The ears and nose were normal in appearance; oropharynx clear, moist mucus membranes.  Neck: The appearance of the neck was normal, with no gross masses or nodules.  Respiratory: Unlabored, no retractions.  Neurological: No focal deficit, moves all extremities.  Exercise Tolerance:  METS>4.

## 2022-12-14 NOTE — PROGRESS NOTE ADULT - SUBJECTIVE AND OBJECTIVE BOX
Ortho Post Op Check    Procedure: L TKR  Surgeon: Mar    Pt comfortable without complaints, pain controlled  Denies CP, SOB, N/V, numbness/tingling     Vital Signs Last 24 Hrs  T(C): 36.3 (12-14-22 @ 21:30), Max: 36.3 (12-14-22 @ 21:30)  T(F): 97.3 (12-14-22 @ 21:30), Max: 97.3 (12-14-22 @ 21:30)  HR: 72 (12-14-22 @ 21:30) (62 - 78)  BP: 128/81 (12-14-22 @ 21:30) (120/74 - 128/81)  BP(mean): 93 (12-14-22 @ 20:29) (93 - 97)  RR: 17 (12-14-22 @ 21:30) (16 - 18)  SpO2: 97% (12-14-22 @ 21:30) (92% - 98%)  I&O's Summary    14 Dec 2022 07:01  -  14 Dec 2022 23:30  --------------------------------------------------------  IN: 100 mL / OUT: 0 mL / NET: 100 mL        Physical Exam:  General: Pt Alert and oriented, NAD, resting comfortably  LLE:  DSG C/D/I, Alivia, Crycoclamp  Pulses: 2+ dp, pt pulses, wwp, cap refill <3 sec  Sensation: SILT in sural/saph/sp/dp/tibial distributions  Motor: EHL/FHL/TA/GS firing              Post-op X-Ray: hardware in place    A/P: 52yFemale s/p L TKR with Dr. Manuel on 12/14  - Stable  - Pain Control  - f/u AM labs  - DVT ppx:  ASA  - Post op abx: periop ancef  - PT, WBS:  WBAT posterior hip precautions  - Dispo: Pending PT    Armani Todd, PGY1  Orthopaedic Surgery

## 2022-12-15 ENCOUNTER — TRANSCRIPTION ENCOUNTER (OUTPATIENT)
Age: 52
End: 2022-12-15

## 2022-12-15 VITALS
RESPIRATION RATE: 17 BRPM | TEMPERATURE: 98 F | DIASTOLIC BLOOD PRESSURE: 80 MMHG | SYSTOLIC BLOOD PRESSURE: 121 MMHG | HEART RATE: 83 BPM | OXYGEN SATURATION: 97 %

## 2022-12-15 LAB
ANION GAP SERPL CALC-SCNC: 11 MMOL/L — SIGNIFICANT CHANGE UP (ref 5–17)
BUN SERPL-MCNC: 14 MG/DL — SIGNIFICANT CHANGE UP (ref 7–23)
CALCIUM SERPL-MCNC: 9.1 MG/DL — SIGNIFICANT CHANGE UP (ref 8.4–10.5)
CHLORIDE SERPL-SCNC: 106 MMOL/L — SIGNIFICANT CHANGE UP (ref 96–108)
CO2 SERPL-SCNC: 19 MMOL/L — LOW (ref 22–31)
CREAT SERPL-MCNC: 0.61 MG/DL — SIGNIFICANT CHANGE UP (ref 0.5–1.3)
EGFR: 108 ML/MIN/1.73M2 — SIGNIFICANT CHANGE UP
GLUCOSE SERPL-MCNC: 138 MG/DL — HIGH (ref 70–99)
HCT VFR BLD CALC: 36.7 % — SIGNIFICANT CHANGE UP (ref 34.5–45)
HGB BLD-MCNC: 10.8 G/DL — LOW (ref 11.5–15.5)
MCHC RBC-ENTMCNC: 25.4 PG — LOW (ref 27–34)
MCHC RBC-ENTMCNC: 29.4 GM/DL — LOW (ref 32–36)
MCV RBC AUTO: 86.2 FL — SIGNIFICANT CHANGE UP (ref 80–100)
NRBC # BLD: 0 /100 WBCS — SIGNIFICANT CHANGE UP (ref 0–0)
PLATELET # BLD AUTO: 109 K/UL — LOW (ref 150–400)
POTASSIUM SERPL-MCNC: 4.6 MMOL/L — SIGNIFICANT CHANGE UP (ref 3.5–5.3)
POTASSIUM SERPL-SCNC: 4.6 MMOL/L — SIGNIFICANT CHANGE UP (ref 3.5–5.3)
RBC # BLD: 4.26 M/UL — SIGNIFICANT CHANGE UP (ref 3.8–5.2)
RBC # FLD: 15.5 % — HIGH (ref 10.3–14.5)
SODIUM SERPL-SCNC: 136 MMOL/L — SIGNIFICANT CHANGE UP (ref 135–145)
WBC # BLD: 6.26 K/UL — SIGNIFICANT CHANGE UP (ref 3.8–10.5)
WBC # FLD AUTO: 6.26 K/UL — SIGNIFICANT CHANGE UP (ref 3.8–10.5)

## 2022-12-15 RX ORDER — PANTOPRAZOLE SODIUM 20 MG/1
1 TABLET, DELAYED RELEASE ORAL
Qty: 14 | Refills: 0
Start: 2022-12-15 | End: 2022-12-28

## 2022-12-15 RX ORDER — HYDROMORPHONE HYDROCHLORIDE 2 MG/ML
1 INJECTION INTRAMUSCULAR; INTRAVENOUS; SUBCUTANEOUS
Qty: 42 | Refills: 0
Start: 2022-12-15 | End: 2022-12-21

## 2022-12-15 RX ORDER — CELECOXIB 200 MG/1
1 CAPSULE ORAL
Qty: 28 | Refills: 0
Start: 2022-12-15 | End: 2022-12-28

## 2022-12-15 RX ORDER — AMLODIPINE BESYLATE 2.5 MG/1
5 TABLET ORAL DAILY
Refills: 0 | Status: DISCONTINUED | OUTPATIENT
Start: 2022-12-15 | End: 2022-12-15

## 2022-12-15 RX ORDER — ACETAMINOPHEN 500 MG
3 TABLET ORAL
Qty: 0 | Refills: 0 | DISCHARGE
Start: 2022-12-15

## 2022-12-15 RX ORDER — HYDROMORPHONE HYDROCHLORIDE 2 MG/ML
2 INJECTION INTRAMUSCULAR; INTRAVENOUS; SUBCUTANEOUS EVERY 4 HOURS
Refills: 0 | Status: DISCONTINUED | OUTPATIENT
Start: 2022-12-15 | End: 2022-12-15

## 2022-12-15 RX ORDER — HYDROMORPHONE HYDROCHLORIDE 2 MG/ML
4 INJECTION INTRAMUSCULAR; INTRAVENOUS; SUBCUTANEOUS EVERY 4 HOURS
Refills: 0 | Status: DISCONTINUED | OUTPATIENT
Start: 2022-12-15 | End: 2022-12-15

## 2022-12-15 RX ORDER — RIVAROXABAN 15 MG-20MG
20 KIT ORAL
Refills: 0 | Status: DISCONTINUED | OUTPATIENT
Start: 2022-12-15 | End: 2022-12-15

## 2022-12-15 RX ORDER — SENNA PLUS 8.6 MG/1
2 TABLET ORAL
Qty: 0 | Refills: 0 | DISCHARGE
Start: 2022-12-15

## 2022-12-15 RX ORDER — RIVAROXABAN 15 MG-20MG
15 KIT ORAL
Refills: 0 | Status: DISCONTINUED | OUTPATIENT
Start: 2022-12-15 | End: 2022-12-15

## 2022-12-15 RX ORDER — RIVAROXABAN 15 MG-20MG
1 KIT ORAL
Qty: 0 | Refills: 0 | DISCHARGE
Start: 2022-12-15

## 2022-12-15 RX ADMIN — Medication 975 MILLIGRAM(S): at 05:14

## 2022-12-15 RX ADMIN — HYDROMORPHONE HYDROCHLORIDE 2 MILLIGRAM(S): 2 INJECTION INTRAMUSCULAR; INTRAVENOUS; SUBCUTANEOUS at 15:26

## 2022-12-15 RX ADMIN — Medication 100 MILLIGRAM(S): at 01:26

## 2022-12-15 RX ADMIN — OXYCODONE HYDROCHLORIDE 10 MILLIGRAM(S): 5 TABLET ORAL at 12:39

## 2022-12-15 RX ADMIN — Medication 975 MILLIGRAM(S): at 06:14

## 2022-12-15 RX ADMIN — Medication 100 MILLIGRAM(S): at 11:25

## 2022-12-15 RX ADMIN — CELECOXIB 200 MILLIGRAM(S): 200 CAPSULE ORAL at 06:13

## 2022-12-15 RX ADMIN — Medication 15 MILLIGRAM(S): at 05:43

## 2022-12-15 RX ADMIN — CELECOXIB 200 MILLIGRAM(S): 200 CAPSULE ORAL at 05:13

## 2022-12-15 RX ADMIN — HYDROMORPHONE HYDROCHLORIDE 2 MILLIGRAM(S): 2 INJECTION INTRAMUSCULAR; INTRAVENOUS; SUBCUTANEOUS at 14:26

## 2022-12-15 RX ADMIN — ONDANSETRON 4 MILLIGRAM(S): 8 TABLET, FILM COATED ORAL at 03:35

## 2022-12-15 RX ADMIN — OXYCODONE HYDROCHLORIDE 10 MILLIGRAM(S): 5 TABLET ORAL at 11:39

## 2022-12-15 RX ADMIN — Medication 975 MILLIGRAM(S): at 13:28

## 2022-12-15 RX ADMIN — Medication 15 MILLIGRAM(S): at 05:13

## 2022-12-15 RX ADMIN — PANTOPRAZOLE SODIUM 40 MILLIGRAM(S): 20 TABLET, DELAYED RELEASE ORAL at 05:13

## 2022-12-15 RX ADMIN — Medication 975 MILLIGRAM(S): at 14:28

## 2022-12-15 RX ADMIN — OXYCODONE HYDROCHLORIDE 10 MILLIGRAM(S): 5 TABLET ORAL at 09:39

## 2022-12-15 RX ADMIN — OXYCODONE HYDROCHLORIDE 10 MILLIGRAM(S): 5 TABLET ORAL at 08:39

## 2022-12-15 NOTE — PHYSICAL THERAPY INITIAL EVALUATION ADULT - PERTINENT HX OF CURRENT PROBLEM, REHAB EVAL
51yo f c/o left knee pain x chronic. Pt denies acute preceding trauma/injury. Pt states her left knee pain is localized; she takes tylenol as needed for pain control. Denies numbness/tingling/weakness of bilateral lower extremities. She does not ambulate with an assistive device at baseline.

## 2022-12-15 NOTE — DISCHARGE NOTE PROVIDER - NSDCMRMEDTOKEN_GEN_ALL_CORE_FT
amLODIPine 5 mg oral tablet: 1 tab(s) orally once a day  irbesartan: 12.5 milligram(s) orally once a day  Lipitor 10 mg oral tablet: 1 tab(s) orally once a day  Xarelto 15 mg oral tablet: 1 tab(s) orally 2 times a day    acetaminophen 325 mg oral tablet: 3 tab(s) orally every 8 hours  amLODIPine 5 mg oral tablet: 1 tab(s) orally once a day  celecoxib 200 mg oral capsule: 1 cap(s) orally every 12 hours MDD:2  irbesartan: 12.5 milligram(s) orally once a day  Lipitor 10 mg oral tablet: 1 tab(s) orally once a day  pantoprazole 40 mg oral delayed release tablet: 1 tab(s) orally once a day (before a meal) MDD:1  rivaroxaban 20 mg oral tablet: 1 tab(s) orally once a day (before a meal)  senna leaf extract oral tablet: 2 tab(s) orally once a day (at bedtime)   acetaminophen 325 mg oral tablet: 3 tab(s) orally every 8 hours  amLODIPine 5 mg oral tablet: 1 tab(s) orally once a day  HYDROmorphone 2 mg oral tablet: 1-2  tab(s) orally every 4 hours, As needed, Moderate to severe Pain  MDD:6  irbesartan: 12.5 milligram(s) orally once a day  Lipitor 10 mg oral tablet: 1 tab(s) orally once a day  pantoprazole 40 mg oral delayed release tablet: 1 tab(s) orally once a day (before a meal) MDD:1  rivaroxaban 20 mg oral tablet: 1 tab(s) orally once a day (before a meal)  senna leaf extract oral tablet: 2 tab(s) orally once a day (at bedtime)

## 2022-12-15 NOTE — PHYSICAL THERAPY INITIAL EVALUATION ADULT - LIGHT TOUCH SENSATION, LLE, REHAB EVAL
Intact to light touch sensation testing, however reports 90% sensation on plantar surface of foot in comparison to RLE

## 2022-12-15 NOTE — PHYSICAL THERAPY INITIAL EVALUATION ADULT - ACTIVE RANGE OF MOTION EXAMINATION, REHAB EVAL
LLE AROM WFL with exception of knee flexion 5-80 degrees/bilateral upper extremity Active ROM was WFL (within functional limits)/Right LE Active ROM was WFL (within functional limits)

## 2022-12-15 NOTE — DISCHARGE NOTE NURSING/CASE MANAGEMENT/SOCIAL WORK - PATIENT PORTAL LINK FT
You can access the FollowMyHealth Patient Portal offered by [facility] by registering at the following website: [email or web address]. By joining [employer]’s FollowMyHealth portal, you will also be able to view your health information using other applications (apps) compatible with our system.

## 2022-12-15 NOTE — PHYSICAL THERAPY INITIAL EVALUATION ADULT - GENERAL OBSERVATIONS, REHAB EVAL
Pt received semi supine, +L knee incision bandage C/D/I, +ELLI, +heplock, +bilateral SCDs, NAD, agreeable to PT.

## 2022-12-15 NOTE — PROGRESS NOTE ADULT - SUBJECTIVE AND OBJECTIVE BOX
POST OPERATIVE DAY #:   STATUS POST: Left    Right  TKA/THR                        SUBJECTIVE: Patient seen and examined.   Denies any sob/cp/n/v/numbness or tingling in b/l     OBJECTIVE:     Vital Signs Last 24 Hrs  T(C): 36.3 (15 Dec 2022 08:29), Max: 36.4 (15 Dec 2022 00:10)  T(F): 97.3 (15 Dec 2022 08:29), Max: 97.5 (15 Dec 2022 00:10)  HR: 67 (15 Dec 2022 08:29) (62 - 78)  BP: 118/74 (15 Dec 2022 08:29) (110/71 - 128/81)  BP(mean): 93 (14 Dec 2022 20:29) (93 - 97)  RR: 18 (15 Dec 2022 08:29) (16 - 18)  SpO2: 98% (15 Dec 2022 08:29) (92% - 98%)    Parameters below as of 15 Dec 2022 08:29  Patient On (Oxygen Delivery Method): room air        General:  Affected extremity:   Dressing: clean/dry/intact   Sensation: intact to light touch to patient's baseline  Motor exam: EHL/TA/GS    Pulses             I&O's Detail    14 Dec 2022 07:01  -  15 Dec 2022 07:00  --------------------------------------------------------  IN:    Oral Fluid: 100 mL  Total IN: 100 mL    OUT:    Voided (mL): 500 mL  Total OUT: 500 mL    Total NET: -400 mL      15 Dec 2022 07:01  -  15 Dec 2022 11:47  --------------------------------------------------------  IN:    Oral Fluid: 240 mL  Total IN: 240 mL    OUT:    Voided (mL): 100 mL  Total OUT: 100 mL    Total NET: 140 mL          LABS:                        10.8   6.26  )-----------( 109      ( 15 Dec 2022 05:30 )             36.7     12-15    136  |  106  |  14  ----------------------------<  138<H>  4.6   |  19<L>  |  0.61    Ca    9.1      15 Dec 2022 05:30            MEDICATIONS:    acetaminophen     Tablet .. 975 milliGRAM(s) Oral every 8 hours  celecoxib 200 milliGRAM(s) Oral every 12 hours  HYDROmorphone  Injectable 0.5 milliGRAM(s) IV Push every 4 hours PRN  ondansetron Injectable 4 milliGRAM(s) IV Push every 6 hours PRN  oxyCODONE    IR 5 milliGRAM(s) Oral every 3 hours PRN  oxyCODONE    IR 10 milliGRAM(s) Oral every 3 hours PRN    rivaroxaban 20 milliGRAM(s) Oral with dinner        ASSESSMENT AND PLAN: 51yo Female s/p     1. Analgesic pain control  2. DVT prophylaxis: ASA      HSQ       Coumadin      Lovenox      SCDs      Other:   3. Weight Bearing Status:  Weight bearing as tolerated       NWB         Partial Weight Bearing  4. Disposition: Home          Subacute Rehab      pending PT evaluation POST OPERATIVE DAY #: 1  STATUS POST: Left TKA                        SUBJECTIVE: Patient seen and examined. She states she is doing well and her pain is controlled. She admits to some numbness in her left LE as well.   Denies any sob/cp/n/v    OBJECTIVE:     Vital Signs Last 24 Hrs  T(C): 36.3 (15 Dec 2022 08:29), Max: 36.4 (15 Dec 2022 00:10)  T(F): 97.3 (15 Dec 2022 08:29), Max: 97.5 (15 Dec 2022 00:10)  HR: 67 (15 Dec 2022 08:29) (62 - 78)  BP: 118/74 (15 Dec 2022 08:29) (110/71 - 128/81)  BP(mean): 93 (14 Dec 2022 20:29) (93 - 97)  RR: 18 (15 Dec 2022 08:29) (16 - 18)  SpO2: 98% (15 Dec 2022 08:29) (92% - 98%)    Parameters below as of 15 Dec 2022 08:29  Patient On (Oxygen Delivery Method): room air        General: patient appears comfortable NAD  Affected extremity: left LE  Dressing: ELLI clean/dry/intact   Sensation: decreased sensation noted along LLE  Motor exam: Right LE: FHL/EHL/TA/GS  5/5                            Left LE: FHL/EHL/TA/GS  3/5  Pulses DP/PT 2+ B/L             I&O's Detail    14 Dec 2022 07:01  -  15 Dec 2022 07:00  --------------------------------------------------------  IN:    Oral Fluid: 100 mL  Total IN: 100 mL    OUT:    Voided (mL): 500 mL  Total OUT: 500 mL    Total NET: -400 mL      15 Dec 2022 07:01  -  15 Dec 2022 11:47  --------------------------------------------------------  IN:    Oral Fluid: 240 mL  Total IN: 240 mL    OUT:    Voided (mL): 100 mL  Total OUT: 100 mL    Total NET: 140 mL          LABS:                        10.8   6.26  )-----------( 109      ( 15 Dec 2022 05:30 )             36.7     12-15    136  |  106  |  14  ----------------------------<  138<H>  4.6   |  19<L>  |  0.61    Ca    9.1      15 Dec 2022 05:30            MEDICATIONS:    acetaminophen     Tablet .. 975 milliGRAM(s) Oral every 8 hours  celecoxib 200 milliGRAM(s) Oral every 12 hours  HYDROmorphone  Injectable 0.5 milliGRAM(s) IV Push every 4 hours PRN  ondansetron Injectable 4 milliGRAM(s) IV Push every 6 hours PRN  oxyCODONE    IR 5 milliGRAM(s) Oral every 3 hours PRN  oxyCODONE    IR 10 milliGRAM(s) Oral every 3 hours PRN    rivaroxaban 20 milliGRAM(s) Oral with dinner        ASSESSMENT AND PLAN: 53yo Female s/p Left TKA 12/14/22 Dr. Manuel     1. Analgesic pain control  2. DVT prophylaxis: xarelGladys telles      3. Weight Bearing Status:  Weight bearing as tolerated    4. Disposition:   pending PT evaluation clearence  POST OPERATIVE DAY #: 1  STATUS POST: Left TKA                        SUBJECTIVE: Patient seen and examined. She states she is doing well and her pain is controlled. She admits to some numbness in her left LE as well.   Denies any sob/cp/n/v    OBJECTIVE:     Vital Signs Last 24 Hrs  T(C): 36.3 (15 Dec 2022 08:29), Max: 36.4 (15 Dec 2022 00:10)  T(F): 97.3 (15 Dec 2022 08:29), Max: 97.5 (15 Dec 2022 00:10)  HR: 67 (15 Dec 2022 08:29) (62 - 78)  BP: 118/74 (15 Dec 2022 08:29) (110/71 - 128/81)  BP(mean): 93 (14 Dec 2022 20:29) (93 - 97)  RR: 18 (15 Dec 2022 08:29) (16 - 18)  SpO2: 98% (15 Dec 2022 08:29) (92% - 98%)    Parameters below as of 15 Dec 2022 08:29  Patient On (Oxygen Delivery Method): room air        General: patient appears comfortable NAD  Affected extremity: left LE  Dressing: ELLI clean/dry/intact   Sensation: decreased sensation noted along LLE  Motor exam: Right LE: FHL/EHL/TA/GS  5/5                            Left LE: FHL/EHL/TA/GS  3/5  Pulses DP/PT 2+ B/L             I&O's Detail    14 Dec 2022 07:01  -  15 Dec 2022 07:00  --------------------------------------------------------  IN:    Oral Fluid: 100 mL  Total IN: 100 mL    OUT:    Voided (mL): 500 mL  Total OUT: 500 mL    Total NET: -400 mL      15 Dec 2022 07:01  -  15 Dec 2022 11:47  --------------------------------------------------------  IN:    Oral Fluid: 240 mL  Total IN: 240 mL    OUT:    Voided (mL): 100 mL  Total OUT: 100 mL    Total NET: 140 mL          LABS:                        10.8   6.26  )-----------( 109      ( 15 Dec 2022 05:30 )             36.7     12-15    136  |  106  |  14  ----------------------------<  138<H>  4.6   |  19<L>  |  0.61    Ca    9.1      15 Dec 2022 05:30            MEDICATIONS:    acetaminophen     Tablet .. 975 milliGRAM(s) Oral every 8 hours  celecoxib 200 milliGRAM(s) Oral every 12 hours  HYDROmorphone  Injectable 0.5 milliGRAM(s) IV Push every 4 hours PRN  ondansetron Injectable 4 milliGRAM(s) IV Push every 6 hours PRN  oxyCODONE    IR 5 milliGRAM(s) Oral every 3 hours PRN  oxyCODONE    IR 10 milliGRAM(s) Oral every 3 hours PRN    rivaroxaban 20 milliGRAM(s) Oral with dinner        ASSESSMENT AND PLAN: 51yo Female s/p Left TKA 12/14/22 Dr. Manuel     1. Analgesic pain control  2. DVT prophylaxis: xarelto, SCDs      3. Weight Bearing Status:  Weight bearing as tolerated    4. Disposition:   pending PT evaluation clearence   5. HX of PE: restart home meds- xarelto   6. HTN- continue home meds POST OPERATIVE DAY #: 1  STATUS POST: Left TKA                        SUBJECTIVE: Patient seen and examined. She states she is doing well and her pain is controlled. She admits to some numbness in her left LE as well.   Denies any sob/cp/n/v    OBJECTIVE:     Vital Signs Last 24 Hrs  T(C): 36.3 (15 Dec 2022 08:29), Max: 36.4 (15 Dec 2022 00:10)  T(F): 97.3 (15 Dec 2022 08:29), Max: 97.5 (15 Dec 2022 00:10)  HR: 67 (15 Dec 2022 08:29) (62 - 78)  BP: 118/74 (15 Dec 2022 08:29) (110/71 - 128/81)  BP(mean): 93 (14 Dec 2022 20:29) (93 - 97)  RR: 18 (15 Dec 2022 08:29) (16 - 18)  SpO2: 98% (15 Dec 2022 08:29) (92% - 98%)    Parameters below as of 15 Dec 2022 08:29  Patient On (Oxygen Delivery Method): room air        General: patient appears comfortable NAD  Affected extremity: left LE  Dressing: ELLI clean/dry/intact   Sensation: decreased sensation noted along LLE  Motor exam: Right LE: FHL/EHL/TA/GS  5/5                            Left LE: FHL/EHL/TA/GS  3/5  Pulses DP/PT 2+ B/L             I&O's Detail    14 Dec 2022 07:01  -  15 Dec 2022 07:00  --------------------------------------------------------  IN:    Oral Fluid: 100 mL  Total IN: 100 mL    OUT:    Voided (mL): 500 mL  Total OUT: 500 mL    Total NET: -400 mL      15 Dec 2022 07:01  -  15 Dec 2022 11:47  --------------------------------------------------------  IN:    Oral Fluid: 240 mL  Total IN: 240 mL    OUT:    Voided (mL): 100 mL  Total OUT: 100 mL    Total NET: 140 mL          LABS:                        10.8   6.26  )-----------( 109      ( 15 Dec 2022 05:30 )             36.7     12-15    136  |  106  |  14  ----------------------------<  138<H>  4.6   |  19<L>  |  0.61    Ca    9.1      15 Dec 2022 05:30            MEDICATIONS:    acetaminophen     Tablet .. 975 milliGRAM(s) Oral every 8 hours  celecoxib 200 milliGRAM(s) Oral every 12 hours  HYDROmorphone  Injectable 0.5 milliGRAM(s) IV Push every 4 hours PRN  ondansetron Injectable 4 milliGRAM(s) IV Push every 6 hours PRN  oxyCODONE    IR 5 milliGRAM(s) Oral every 3 hours PRN  oxyCODONE    IR 10 milliGRAM(s) Oral every 3 hours PRN    rivaroxaban 20 milliGRAM(s) Oral with dinner        ASSESSMENT AND PLAN: 51yo Female s/p Left TKA 12/14/22 Dr. Manuel     1. Analgesic pain control  2. DVT prophylaxis: xarelto, SCDs      3. Weight Bearing Status:  Weight bearing as tolerated    4. Disposition:   pending PT clearence   5. HX of PE: restart home meds- xarelto   6. HTN- continue home meds POST OPERATIVE DAY #: 1  STATUS POST: Left TKA                        SUBJECTIVE: Patient seen and examined. She states she is doing well and her pain is controlled. She admits to some numbness in her left LE as well.   Denies any sob/cp/n/v    OBJECTIVE:     Vital Signs Last 24 Hrs  T(C): 36.3 (15 Dec 2022 08:29), Max: 36.4 (15 Dec 2022 00:10)  T(F): 97.3 (15 Dec 2022 08:29), Max: 97.5 (15 Dec 2022 00:10)  HR: 67 (15 Dec 2022 08:29) (62 - 78)  BP: 118/74 (15 Dec 2022 08:29) (110/71 - 128/81)  BP(mean): 93 (14 Dec 2022 20:29) (93 - 97)  RR: 18 (15 Dec 2022 08:29) (16 - 18)  SpO2: 98% (15 Dec 2022 08:29) (92% - 98%)    Parameters below as of 15 Dec 2022 08:29  Patient On (Oxygen Delivery Method): room air        General: patient appears comfortable NAD  Affected extremity: left LE  Dressing: ELLI clean/dry/intact   Sensation: decreased sensation noted along LLE  Motor exam: Right LE: HAM/QUAD/FHL/EHL/TA/GS  5/5                            Left LE: HAM/QUAD/FHL/EHL/TA/GS  3/5  Pulses DP/PT 2+ B/L             I&O's Detail    14 Dec 2022 07:01  -  15 Dec 2022 07:00  --------------------------------------------------------  IN:    Oral Fluid: 100 mL  Total IN: 100 mL    OUT:    Voided (mL): 500 mL  Total OUT: 500 mL    Total NET: -400 mL      15 Dec 2022 07:01  -  15 Dec 2022 11:47  --------------------------------------------------------  IN:    Oral Fluid: 240 mL  Total IN: 240 mL    OUT:    Voided (mL): 100 mL  Total OUT: 100 mL    Total NET: 140 mL          LABS:                        10.8   6.26  )-----------( 109      ( 15 Dec 2022 05:30 )             36.7     12-15    136  |  106  |  14  ----------------------------<  138<H>  4.6   |  19<L>  |  0.61    Ca    9.1      15 Dec 2022 05:30            MEDICATIONS:    acetaminophen     Tablet .. 975 milliGRAM(s) Oral every 8 hours  celecoxib 200 milliGRAM(s) Oral every 12 hours  HYDROmorphone  Injectable 0.5 milliGRAM(s) IV Push every 4 hours PRN  ondansetron Injectable 4 milliGRAM(s) IV Push every 6 hours PRN  oxyCODONE    IR 5 milliGRAM(s) Oral every 3 hours PRN  oxyCODONE    IR 10 milliGRAM(s) Oral every 3 hours PRN    rivaroxaban 20 milliGRAM(s) Oral with dinner        ASSESSMENT AND PLAN: 51yo Female s/p Left TKA 12/14/22 Dr. Manuel     1. Analgesic pain control  2. DVT prophylaxis: xarelto, SCDs      3. Weight Bearing Status:  Weight bearing as tolerated    4. Disposition:   pending PT clearence   5. HX of PE: restart home meds- xarelto   6. HTN- continue home meds POST OPERATIVE DAY #: 1  STATUS POST: Left TKA                        SUBJECTIVE: Patient seen and examined. She states she is doing well and her pain is controlled. She admits to some numbness in her left LE as well.   Denies any sob/cp/n/v    OBJECTIVE:     Vital Signs Last 24 Hrs  T(C): 36.3 (15 Dec 2022 08:29), Max: 36.4 (15 Dec 2022 00:10)  T(F): 97.3 (15 Dec 2022 08:29), Max: 97.5 (15 Dec 2022 00:10)  HR: 67 (15 Dec 2022 08:29) (62 - 78)  BP: 118/74 (15 Dec 2022 08:29) (110/71 - 128/81)  BP(mean): 93 (14 Dec 2022 20:29) (93 - 97)  RR: 18 (15 Dec 2022 08:29) (16 - 18)  SpO2: 98% (15 Dec 2022 08:29) (92% - 98%)    Parameters below as of 15 Dec 2022 08:29  Patient On (Oxygen Delivery Method): room air        General: patient appears comfortable NAD  Affected extremity: left LE  Dressing: ELLI clean/dry/intact   Sensation: decreased sensation noted along LLE, intact right le   Motor exam: Right LE: FHL/EHL/TA/GS  5/5                            Left LE: FHL/EHL/TA  3/5 firing but weak, 4/5 GS  Pulses DP/PT 2+ B/L             I&O's Detail    14 Dec 2022 07:01  -  15 Dec 2022 07:00  --------------------------------------------------------  IN:    Oral Fluid: 100 mL  Total IN: 100 mL    OUT:    Voided (mL): 500 mL  Total OUT: 500 mL    Total NET: -400 mL      15 Dec 2022 07:01  -  15 Dec 2022 11:47  --------------------------------------------------------  IN:    Oral Fluid: 240 mL  Total IN: 240 mL    OUT:    Voided (mL): 100 mL  Total OUT: 100 mL    Total NET: 140 mL          LABS:                        10.8   6.26  )-----------( 109      ( 15 Dec 2022 05:30 )             36.7     12-15    136  |  106  |  14  ----------------------------<  138<H>  4.6   |  19<L>  |  0.61    Ca    9.1      15 Dec 2022 05:30            MEDICATIONS:    acetaminophen     Tablet .. 975 milliGRAM(s) Oral every 8 hours  celecoxib 200 milliGRAM(s) Oral every 12 hours  HYDROmorphone  Injectable 0.5 milliGRAM(s) IV Push every 4 hours PRN  ondansetron Injectable 4 milliGRAM(s) IV Push every 6 hours PRN  oxyCODONE    IR 5 milliGRAM(s) Oral every 3 hours PRN  oxyCODONE    IR 10 milliGRAM(s) Oral every 3 hours PRN    rivaroxaban 20 milliGRAM(s) Oral with dinner        ASSESSMENT AND PLAN: 53yo Female s/p Left TKA 12/14/22 Dr. Manuel     1. Analgesic pain control  2. DVT prophylaxis: xarelGladys telles      3. Weight Bearing Status:  Weight bearing as tolerated    4. Disposition:   pending PT clearence   5. HX of PE: restart home meds- xarelto   6. HTN- continue home meds  7. Motor and sensation decreased in peroneal nerve distribution of left le, but patient able to walk with no issues, will place pillow underneath knee vs. anesthesia has not worn off as she had a late surgery. Will follow up.

## 2022-12-15 NOTE — DISCHARGE NOTE PROVIDER - NSDCFUADDINST_GEN_ALL_CORE_FT
ACTIVITY:  - s/p left total knee replacement   - Weightbearing as tolerated with assistive device. No strenuous activity, heavy lifting, driving or returning to work until cleared by MD.  - You may experience postoperative swelling on the operative extremity. You may ice the surgery site for 20 minute intervals.  - If you have had a knee replacement, do not place pillows or bolsters underneath the back of the knee.     DRESSING: You have a ELLI dressing. It is water resistant, but not waterproof. You may shower; however, the pump should be disconnected and placed in a safe location where it will not get wet.  No soaking in bathtubs.  The dressing has a battery that usually dies in 7 days. Once this occurs, you may remove the dressing and dispose of it, then leave incision open to air. Keep incision clean and dry.    - You may shower, your dressing is water-resistant. Do not soak in bathtubs. Remove dressing after postop day 7, then leave incision open to air.  - Keep your incision clean and dry. Do not pick at your incision. Do not apply creams, ointments or oils to your incision until cleared by your surgeon. Do not soak your incision in sitting water (ie tubs, pools, lakes, etc.) until cleared by your surgeon. You may let clean, running water fall over your incision.    MEDICATION/ANTICOAGULATION:  -You have been prescribed Xarelto, as a preventative to help prevent postoperative blood clots. Please take this medication as prescribed.   - You have been prescribed medications for pain:    - Tylenol for mild to moderate pain. Do not exceed 3,000mg daily.    - For more severe pain, take Tylenol with the addition of narcotic pain medication. Take this medication as prescribed. This medication may cause drowsiness or dizziness. Do not operate machinery. This medication may cause constipation.  - For any additional medications, follow instructions on the bottle.   -Try to have regular bowel movements. Take stool softener or laxative if necessary. You may wish to take Miralax daily until you have regular bowel movements.   - If you have been prescribed Aspirin or an anti-inflammatory, please take Prilosec once a day, before breakfast, until no longer taking Aspirin or anti-inflammatory. This will help protect your stomach.  - If you have a pain management physician, please follow-up with them postoperatively.   - If you experience any negative side effects of your medications, please call your surgeon's office to discuss.    Follow-up:  - Call to schedule an appt with Dr. Manuel for follow up. If you have staples or sutures they will be removed in office.  - Please follow-up with your primary care physician or any other specialist you see postoperatively, if needed.   - Contact your doctor if you experience: fever greater than 101.5, chills, chest pain, difficulty breathing, redness or excessive drainage around the incision, other concerns.     ACTIVITY:  - s/p left total knee replacement   - Weightbearing as tolerated with assistive device. No strenuous activity, heavy lifting, driving or returning to work until cleared by MD.  - You may experience postoperative swelling on the operative extremity. You may ice the surgery site for 20 minute intervals.  - If you have had a knee replacement, do not place pillows or bolsters underneath the back of the knee.     DRESSING: You have a ELLI dressing. It is water resistant, but not waterproof. You may shower; however, the pump should be disconnected and placed in a safe location where it will not get wet. No soaking in bathtubs. The dressing has a battery that usually dies in 7 days. Once this occurs, you may remove the dressing and dispose of it, then leave incision open to air. Keep incision clean and dry.    - You may shower, your dressing is water-resistant. Do not soak in bathtubs. Remove dressing after postop day 7, then leave incision open to air.  - Keep your incision clean and dry. Do not pick at your incision. Do not apply creams, ointments or oils to your incision until cleared by your surgeon. Do not soak your incision in sitting water (ie tubs, pools, lakes, etc.) until cleared by your surgeon. You may let clean, running water fall over your incision.    MEDICATION/ANTICOAGULATION:  -You have been prescribed Xarelto, as a preventative to help prevent postoperative blood clots. Please take this medication as prescribed.   - You have been prescribed medications for pain:    - Tylenol for mild to moderate pain. Do not exceed 3,000mg daily.    - For more severe pain, take Tylenol with the addition of narcotic pain medication. Take this medication as prescribed. This medication may cause drowsiness or dizziness. Do not operate machinery. This medication may cause constipation.  - For any additional medications, follow instructions on the bottle.   -Try to have regular bowel movements. Take stool softener or laxative if necessary. You may wish to take Miralax daily until you have regular bowel movements.   - If you have been prescribed Aspirin or an anti-inflammatory, please take Prilosec once a day, before breakfast, until no longer taking Aspirin or anti-inflammatory. This will help protect your stomach.  - If you have a pain management physician, please follow-up with them postoperatively.   - If you experience any negative side effects of your medications, please call your surgeon's office to discuss.    Follow-up:  - Call to schedule an appt with Dr. Manuel for follow up. If you have staples or sutures they will be removed in office.  - Please follow-up with your primary care physician or any other specialist you see postoperatively, if needed.   - Contact your doctor if you experience: fever greater than 101.5, chills, chest pain, difficulty breathing, redness or excessive drainage around the incision, other concerns.

## 2022-12-15 NOTE — DISCHARGE NOTE PROVIDER - HOSPITAL COURSE
Admitted 12/14  Attending: Dr. Manuel   Surgery Left Total knee replacement   Shanell-op Antibiotics  Pain control  DVT prophylaxis  OOB/Physical Therapy

## 2022-12-15 NOTE — PHYSICAL THERAPY INITIAL EVALUATION ADULT - ADDITIONAL COMMENTS
Pt lives with her two sons in an apartment with no INESSA. At baseline, ambulates independently with no DME.

## 2022-12-15 NOTE — DISCHARGE NOTE PROVIDER - CARE PROVIDERS DIRECT ADDRESSES
[email or web address]

## 2022-12-15 NOTE — PHYSICAL THERAPY INITIAL EVALUATION ADULT - MANUAL MUSCLE TESTING RESULTS, REHAB EVAL
functional mobility testing; >/=3+/5 bilateral UE and LE (RLE DF and PF 5/5, LLE DF 3/5 and PF 4/5)/grossly assessed due to

## 2022-12-15 NOTE — DISCHARGE NOTE PROVIDER - CARE PROVIDER_API CALL
Jeffrey Manuel)  Orthopaedic Surgery  [address] [facility], 10th [place] [address]  Phone: [phone]  Fax: [phone]  Follow Up Time: 2 weeks

## 2022-12-16 PROCEDURE — 85027 COMPLETE CBC AUTOMATED: CPT

## 2022-12-16 PROCEDURE — C1776: CPT

## 2022-12-16 PROCEDURE — 97110 THERAPEUTIC EXERCISES: CPT

## 2022-12-16 PROCEDURE — 97116 GAIT TRAINING THERAPY: CPT

## 2022-12-16 PROCEDURE — 36415 COLL VENOUS BLD VENIPUNCTURE: CPT

## 2022-12-16 PROCEDURE — C1713: CPT

## 2022-12-16 PROCEDURE — 80048 BASIC METABOLIC PNL TOTAL CA: CPT

## 2022-12-16 PROCEDURE — 73560 X-RAY EXAM OF KNEE 1 OR 2: CPT

## 2022-12-16 PROCEDURE — 97161 PT EVAL LOW COMPLEX 20 MIN: CPT

## 2022-12-17 DIAGNOSIS — M17.12 UNILATERAL PRIMARY OSTEOARTHRITIS, LEFT KNEE: ICD-10-CM

## 2022-12-17 DIAGNOSIS — I10 ESSENTIAL (PRIMARY) HYPERTENSION: ICD-10-CM

## 2022-12-17 DIAGNOSIS — K21.9 GASTRO-ESOPHAGEAL REFLUX DISEASE WITHOUT ESOPHAGITIS: ICD-10-CM

## 2022-12-22 RX ORDER — HYDROMORPHONE HYDROCHLORIDE 2 MG/1
2 TABLET ORAL EVERY 6 HOURS
Qty: 20 | Refills: 0 | Status: ACTIVE | COMMUNITY
Start: 2022-12-22 | End: 1900-01-01

## 2022-12-30 ENCOUNTER — APPOINTMENT (OUTPATIENT)
Dept: ORTHOPEDIC SURGERY | Facility: CLINIC | Age: 52
End: 2022-12-30
Payer: COMMERCIAL

## 2022-12-30 PROCEDURE — 99024 POSTOP FOLLOW-UP VISIT: CPT

## 2022-12-30 NOTE — REASON FOR VISIT
[FreeTextEntry2] : POA visit Lt knee replacement 12/14. Pt states she is coming along slowly but surely

## 2022-12-30 NOTE — HISTORY OF PRESENT ILLNESS
[de-identified] : Patient is here postop day 14 status post left knee replacement doing well continues with in-house PT.

## 2022-12-30 NOTE — PHYSICAL EXAM
[de-identified] : Left knee wound well-healed range of motion is 5 to 90 degrees.  Appropriate swelling MVI distally.  Patient is complaining of some lateral incisional numbness. [de-identified] : Patient will be sent for radiographs today we will review them later.  No x-ray tech today.

## 2022-12-30 NOTE — DISCUSSION/SUMMARY
[de-identified] : Patient is postop day #14 doing well status post left knee replacement transitioning to outpatient PT follow-up in 4 weeks time.

## 2023-01-13 PROBLEM — K21.9 GASTRO-ESOPHAGEAL REFLUX DISEASE WITHOUT ESOPHAGITIS: Chronic | Status: ACTIVE | Noted: 2022-12-13

## 2023-01-13 PROBLEM — I10 ESSENTIAL (PRIMARY) HYPERTENSION: Chronic | Status: ACTIVE | Noted: 2022-12-13

## 2023-01-13 PROBLEM — Z86.39 PERSONAL HISTORY OF OTHER ENDOCRINE, NUTRITIONAL AND METABOLIC DISEASE: Chronic | Status: ACTIVE | Noted: 2022-12-13

## 2023-01-27 ENCOUNTER — APPOINTMENT (OUTPATIENT)
Dept: ORTHOPEDIC SURGERY | Facility: CLINIC | Age: 53
End: 2023-01-27
Payer: COMMERCIAL

## 2023-01-27 PROCEDURE — 73562 X-RAY EXAM OF KNEE 3: CPT | Mod: LT

## 2023-01-27 PROCEDURE — 99024 POSTOP FOLLOW-UP VISIT: CPT

## 2023-01-27 NOTE — PHYSICAL EXAM
[de-identified] : Wound is well-healed left knee range of motion 0 to 95 degrees. [de-identified] : Knee x-rays were ordered today AP standing individual and sunrise views were obtained showing well-positioned [employer] type left knee prosthesis.

## 2023-01-27 NOTE — HISTORY OF PRESENT ILLNESS
[de-identified] : Patient is here second postop visit she is doing well ambulating with a cane is pleased with the result she is continue with her outpatient PT.

## 2023-01-27 NOTE — DISCUSSION/SUMMARY
[de-identified] : Patient I had a long discussion about the need to improve her terminal flexion.  She will be very adamant and aggressive with physical therapy to improve it.  She will follow-up in 1 month's time we already talked about potential need to consider knee manipulation under anesthesia if she does not have significant flexion improvement.

## 2023-02-27 ENCOUNTER — APPOINTMENT (OUTPATIENT)
Dept: ORTHOPEDIC SURGERY | Facility: CLINIC | Age: 53
End: 2023-02-27
Payer: COMMERCIAL

## 2023-02-27 PROCEDURE — 99024 POSTOP FOLLOW-UP VISIT: CPT

## 2023-02-27 RX ORDER — HYALURONATE SODIUM, STABILIZED 88 MG/4 ML
88 SYRINGE (ML) INTRAARTICULAR
Qty: 1 | Refills: 0 | Status: ACTIVE | OUTPATIENT
Start: 2023-02-27

## 2023-02-27 NOTE — HISTORY OF PRESENT ILLNESS
[de-identified] : Patient returns today status post 2 months left knee replacement.  She is doing quite well very pleased with the result so far.

## 2023-02-27 NOTE — PHYSICAL EXAM
[de-identified] : Left knee surgical scars well-healed range of motion 0 to 105 degrees.  The knee is stable to distress varus valgus stress.

## 2023-02-27 NOTE — DISCUSSION/SUMMARY
[Surgical risks reviewed] : Surgical risks reviewed [de-identified] : Patient I discussed the potential use of a manipulation under anesthesia to help with improvement in her terminal flexion of the left knee.  Patient has good flexion as she stands however would like to improve to at least 117 to 120 degrees to help provide her with enough range of motion to navigate stairs chairs and getting in and out of automobiles with more ease.  The reason risk benefits of the manipulation were discussed in detail including potential complications such as fracture and nonimprovement of flexion.  Patient will consider this option would accommodate her any Wednesday moving forward.

## 2023-03-17 ENCOUNTER — APPOINTMENT (OUTPATIENT)
Dept: ORTHOPEDIC SURGERY | Facility: CLINIC | Age: 53
End: 2023-03-17
Payer: COMMERCIAL

## 2023-03-17 PROCEDURE — 99213 OFFICE O/P EST LOW 20 MIN: CPT | Mod: 25

## 2023-03-17 PROCEDURE — 20611 DRAIN/INJ JOINT/BURSA W/US: CPT | Mod: RT

## 2023-03-17 NOTE — PROCEDURE
[de-identified] : MONOVISC\par [employer]par NDC [address]-21\par LOT #[id number]\par EXP 2025/09/30\par 4ML/22MG\par \par Patient was given a right knee Monovisc injection today.  This is done under sterile conditions and ultrasound guidance the lateral compartment the right knee.  Patient tolerated the injection well. [FreeTextEntry1] : \par

## 2023-03-17 NOTE — DISCUSSION/SUMMARY
[de-identified] : Patient I discussed at length her options at this point she agreed to a single dose HA injection of the right knee this was done under sterile conditions ultrasound guidance.  We also talked about the need to consider knee replacement surgery if the patient does not see sustained symptomatic improvement with today's conservative measures.  We reviewed once again the reasonable risk and benefits of procedure as well as the typical convalescence expectations and expectations for implant longevity due to her age of 52.  Patient will consider her options for monitor symptoms follow-up as needed.\par We also talked at length about the need to further reduce her weight/BMI.  This will help to reduce current symptoms and may prolong her need to consider knee replacement surgery on the right.  We will also help to decrease perioperative complications if and when right knee replacement surgery is undertaken.\par \par \par Today's consultation lasted 20 minutes

## 2023-03-17 NOTE — PHYSICAL EXAM
[de-identified] : Right knee on exam today there is definite medial joint line tenderness or soft tissue swelling and warmth noted.  Range of motion 0 to 125 degrees knee stable to be stress varus valgus stress in both full extension and 90 degrees flexion.

## 2023-03-17 NOTE — REASON FOR VISIT
[Follow-Up Visit] : a follow-up visit for [Knee Pain] : knee pain [FreeTextEntry2] : Rt knee monovisc gel injection

## 2023-03-17 NOTE — HISTORY OF PRESENT ILLNESS
[de-identified] : Patient returns today with continued right knee pain she has an established diagnosis of right knee osteoarthritis.  She is status post 4 months left knee replacement doing quite well with that.  She is here to consider treatment options for her right knee.  Patient may be considering knee replacement surgery sometime in the fall or next winter.  For now she would like some temporizing measures employed.

## 2023-03-31 ENCOUNTER — NON-APPOINTMENT (OUTPATIENT)
Age: 53
End: 2023-03-31

## 2023-04-11 ENCOUNTER — APPOINTMENT (OUTPATIENT)
Dept: ORTHOPEDIC SURGERY | Facility: CLINIC | Age: 53
End: 2023-04-11
Payer: COMMERCIAL

## 2023-04-11 PROCEDURE — 99214 OFFICE O/P EST MOD 30 MIN: CPT

## 2023-04-11 NOTE — PHYSICAL EXAM
[de-identified] : Left knee range of motion is 0-95/100.  Right knee range of motion is 0-105.\par \par Left knee has minimal swelling there is some minimal tenderness to palpation of the knee joint.  There is some mild warmth.  No obvious effusion neurovascular intact distally the knee stable to stress varus valgus stress with full extension and 9 degrees of flexion.\par \par Right knee has definite medial joint line tenderness range of motion 0-105 as stated above.  There is soft tissue swelling and warmth but no obvious effusion noted quad tone is good knee stable to distress varus valgus stress in both full extension and 90 degrees of flexion.

## 2023-04-11 NOTE — HISTORY OF PRESENT ILLNESS
[de-identified] : Patient returns today she is status post December 2022 left knee replacement patient is doing fairly well with that she is struggling with terminal flexion which makes going downstairs difficult.  She is here today to discuss 2 issues the first being the manipulation under anesthesia which is scheduled for 2 weeks from tomorrow as well as possible arrangements for right knee replacement in the future.

## 2023-04-11 NOTE — DISCUSSION/SUMMARY
[de-identified] : Patient I discussed the reasonable risk and benefits of knee manipulation under anesthesia we will undergo this procedure 2 weeks with Alyx stated above.\par \par As far as the right knee is concerned patient I have decided to wait till the fall/winter of next year she can return for intermittent cortisone injections she will also take anti-inflammatories as needed and ice the knee as needed.\par \par Today's consultation lasted 35 minutes.

## 2023-04-24 ENCOUNTER — TRANSCRIPTION ENCOUNTER (OUTPATIENT)
Age: 53
End: 2023-04-24

## 2023-04-24 VITALS
HEART RATE: 77 BPM | WEIGHT: 270.73 LBS | HEIGHT: 71 IN | OXYGEN SATURATION: 97 % | DIASTOLIC BLOOD PRESSURE: 92 MMHG | TEMPERATURE: 97 F | RESPIRATION RATE: 16 BRPM | SYSTOLIC BLOOD PRESSURE: 138 MMHG

## 2023-04-24 NOTE — ASU PREOP CHECKLIST - STERILIZATION AFFIRMATION
Consults  Date of Service: 8/11/2018 12:10 PM    Chase Hughes MD   Electrophysiology   Consult Orders:   1. Inpatient consult to Electrophysiology [[id number]] ordered by KHOI Machado at 08/11/18 0808   Expand All Collapse All    []Hide copied text  []Hover for attribution information  Consult Electrophysiology     Una Amaya, 68 year old female   YOB: 1950, MRN [id number]  Room: [facility]     Admitted: 8/10/2018  Date of Service: 8/11/2018     Attending provider/referral: Dr. Tavo Sanchez MD  Primary Care Physician : Dr. Mary Farrar MD  Electrophysiology Attending: Chase Hughes     Reason for Consultation:  Bradycardia     Impressions:  · Syncope episode during exercise with resultant minor trauma.  She does have more remote history of vasovagal syncope (situational-blood draws) but current episode different  · Chronic marked bradycardia            Recommendations:  · Could go home from my standpoint   · Do recommend treadmill stress echo as further work-up as outpatient to evaluate cardiac structure and HR response to exercise              
no

## 2023-04-24 NOTE — ASU PATIENT PROFILE, ADULT - PATIENT'S HEIGHT AND WEIGHT RECORDED IN THE VITAL SIGNS FLOWSHEET
Oculoplastic Surgeon Procedure Text (F): After obtaining clear surgical margins the patient was sent to oculoplastics for surgical repair.  The patient understands they will receive post-surgical care and follow-up from the referring physician's office. yes

## 2023-04-25 ENCOUNTER — TRANSCRIPTION ENCOUNTER (OUTPATIENT)
Age: 53
End: 2023-04-25

## 2023-04-25 ENCOUNTER — APPOINTMENT (OUTPATIENT)
Dept: ORTHOPEDIC SURGERY | Facility: HOSPITAL | Age: 53
End: 2023-04-25
Payer: COMMERCIAL

## 2023-04-25 ENCOUNTER — OUTPATIENT (OUTPATIENT)
Dept: OUTPATIENT SERVICES | Facility: HOSPITAL | Age: 53
LOS: 1 days | Discharge: HOME | End: 2023-04-25
Payer: COMMERCIAL

## 2023-04-25 VITALS
RESPIRATION RATE: 15 BRPM | DIASTOLIC BLOOD PRESSURE: 82 MMHG | SYSTOLIC BLOOD PRESSURE: 133 MMHG | TEMPERATURE: 97 F | OXYGEN SATURATION: 96 % | HEART RATE: 70 BPM

## 2023-04-25 DIAGNOSIS — Z90.710 ACQUIRED ABSENCE OF BOTH CERVIX AND UTERUS: Chronic | ICD-10-CM

## 2023-04-25 DIAGNOSIS — Z41.9 ENCOUNTER FOR PROCEDURE FOR PURPOSES OTHER THAN REMEDYING HEALTH STATE, UNSPECIFIED: Chronic | ICD-10-CM

## 2023-04-25 PROCEDURE — 27570 FIXATION OF KNEE JOINT: CPT | Mod: LT

## 2023-04-25 RX ORDER — AMLODIPINE BESYLATE 2.5 MG/1
1 TABLET ORAL
Qty: 0 | Refills: 0 | DISCHARGE

## 2023-04-25 RX ORDER — IRBESARTAN 75 MG/1
12.5 TABLET ORAL
Qty: 0 | Refills: 0 | DISCHARGE

## 2023-04-25 RX ORDER — HYDROMORPHONE HYDROCHLORIDE 2 MG/ML
0.5 INJECTION INTRAMUSCULAR; INTRAVENOUS; SUBCUTANEOUS ONCE
Refills: 0 | Status: DISCONTINUED | OUTPATIENT
Start: 2023-04-25 | End: 2023-04-25

## 2023-04-25 RX ORDER — ALBUTEROL 90 UG/1
2 AEROSOL, METERED ORAL
Refills: 0 | DISCHARGE

## 2023-04-25 RX ORDER — FLUTICASONE PROPIONATE 50 MCG
1 SPRAY, SUSPENSION NASAL
Refills: 0 | DISCHARGE

## 2023-04-25 RX ORDER — ATORVASTATIN CALCIUM 80 MG/1
1 TABLET, FILM COATED ORAL
Qty: 0 | Refills: 0 | DISCHARGE

## 2023-04-25 RX ORDER — OXYCODONE HYDROCHLORIDE 5 MG/1
1 TABLET ORAL
Qty: 15 | Refills: 0
Start: 2023-04-25

## 2023-04-25 NOTE — BRIEF OPERATIVE NOTE - NSICDXBRIEFPOSTOP_GEN_ALL_CORE_FT
POST-OP DIAGNOSIS:  Stiffness of left knee 25-Apr-2023 10:00:56 s/p left knee manipulation Kelli Olivera

## 2023-04-25 NOTE — ASU DISCHARGE PLAN (ADULT/PEDIATRIC) - ASU DC SPECIAL INSTRUCTIONSFT
You may weight bear as tolerated  Ice and elevate your left knee  You may take tylenol for the pain, for more severe pain take tylenol in addition to the oxycodone 5mg oral every 4-6 hours as needed  If you have any questions regarding your medications please contact Dr. Manuel's office  Follow up with Dr. Manuel in 2 weeks. You may weight bear as tolerated  Ice and elevate your left knee  Take over the counter pain medication, for more severe pain, take oxycodone 5mg oral every 4-6 hours as needed  If you have any questions regarding your medications please contact Dr. Manuel's office  Follow up with Dr. Manuel in 2 weeks. You may weight bear as tolerated  Ice and elevate your left knee  You may take over the counter tylenol as needed for pain, for more severe pain, take oxycodone 5mg oral every 4-6 hours as needed  If you have any questions regarding your medications please contact Dr. Manuel's office  Follow up with Dr. Manuel in 2 weeks. You can weight bear as tolerated  You have no dressings or wounds  Ice and elevate your left knee to help with pain and swelling  You may take over the counter tylenol as needed for pain, for more severe pain, take oxycodone 5mg oral every 4-6 hours as needed  If you have any questions please contact Dr. Manuel's office  Please call Dr. Manuel to schedule follow up appointment

## 2023-04-25 NOTE — ASU DISCHARGE PLAN (ADULT/PEDIATRIC) - NS MD DC FALL RISK RISK
For information on Fall & Injury Prevention, visit: [email or web address] OR  [email or web address] OR  https://www.cdc.gov/steadi/patient.html

## 2023-04-25 NOTE — ASU DISCHARGE PLAN (ADULT/PEDIATRIC) - CARE PROVIDER_API CALL
Jeffrey Manuel)  Orthopaedic Surgery  [address] [place] [address], 10[address] [place] [address]  Phone: [phone]  Fax: [phone]  Established Patient  Follow Up Time: 2 weeks

## 2023-04-25 NOTE — DISCHARGE NOTE NURSING/CASE MANAGEMENT/SOCIAL WORK - PATIENT PORTAL LINK FT
You can access the FollowMyHealth Patient Portal offered by [facility] by registering at the following website: [email or web address]. By joining [employer]’s FollowMyHealth portal, you will also be able to view your health information using other applications (apps) compatible with our system.

## 2023-04-25 NOTE — DISCHARGE NOTE NURSING/CASE MANAGEMENT/SOCIAL WORK - NSDCPEFALRISK_GEN_ALL_CORE
For information on Fall & Injury Prevention, visit: [email or web address] OR  [email or web address] OR  https://www.cdc.gov/steadi/patient.html

## 2023-05-09 ENCOUNTER — APPOINTMENT (OUTPATIENT)
Dept: ORTHOPEDIC SURGERY | Facility: CLINIC | Age: 53
End: 2023-05-09

## 2023-05-12 ENCOUNTER — APPOINTMENT (OUTPATIENT)
Dept: ORTHOPEDIC SURGERY | Facility: CLINIC | Age: 53
End: 2023-05-12
Payer: COMMERCIAL

## 2023-05-12 PROCEDURE — 99212 OFFICE O/P EST SF 10 MIN: CPT

## 2023-05-12 NOTE — REASON FOR VISIT
[Follow-Up Visit] : a follow-up visit for [Artificial Knee Joint] : an artificial knee joint [Total Knee Replacement Surgery, Aftercare] : total knee replacement surgery, aftercare [FreeTextEntry2] : 2nd visit after Lt Knee manipulation 04.25

## 2023-05-12 NOTE — DISCUSSION/SUMMARY
[6 Weeks] : in 6 weeks [de-identified] : Patient will return in 6 weeks time for her final postop for her left knee manipulation.  Patient also continues to have right knee pain in which we will continue injections for.  Patient will follow-up as needed for any pain.\par \par All paperwork filled out \par \par Visit lasted 22 minutes

## 2023-05-12 NOTE — HISTORY OF PRESENT ILLNESS
[de-identified] : Patient presents today status post left knee replacement surgery.  Patient has recently underwent left knee manipulation in which she states she feels she is doing much better.  Patient is still active in physical therapy as well as doing her own exercises at home.  Patient states that she has subjectively more range of motion of the knee with some increased soreness after the manipulation.  But overall is very satisfied and ambulating without a cane and off all pain medication. \par Patient states her main concern is getting up out of a seated position after sitting down for prolonged period of time [Stable] : stable [3] : a current pain level of 3/10 [Sitting] : worsened by sitting [Knee Flexion] : worsened with knee flexion

## 2023-05-12 NOTE — PHYSICAL EXAM
[de-identified] : Right knee exam today definite medial joint line tenderness. Range of motion is 0-125°. There is no effusion the knee is stable.\par \par Left knee exam today definite medial joint tenderness range of motion 0-130°. There is no effusion noted today patient is

## 2023-06-09 ENCOUNTER — APPOINTMENT (OUTPATIENT)
Dept: ORTHOPEDIC SURGERY | Facility: CLINIC | Age: 53
End: 2023-06-09
Payer: COMMERCIAL

## 2023-06-09 PROCEDURE — 99212 OFFICE O/P EST SF 10 MIN: CPT

## 2023-06-09 RX ORDER — METHYLPREDNISOLONE 4 MG/1
4 TABLET ORAL
Qty: 1 | Refills: 4 | Status: ACTIVE | COMMUNITY
Start: 2023-06-09 | End: 1900-01-01

## 2023-06-09 NOTE — HISTORY OF PRESENT ILLNESS
[de-identified] : Patient about 6 weeks status post left knee manipulation she feels improved for sure in terms of flexibility she no longer uses external support for ambulation.  She is still trying to improve her terminal flexion.

## 2023-06-09 NOTE — DISCUSSION/SUMMARY
[de-identified] : Patient will continue to work with physical therapy we both the agreed to move forward with application of a Medrol Dosepak.  Reasonable risk benefits of medication were discussed in detail including potential side effects we also reviewed her current medication profile there is no relative contraindication to its current use.  Patient will follow-up with me in 1 month's time for final check if she continues to have difficulty with terminal flexion beyond 110/115 we may consider a final manipulation.

## 2023-07-10 ENCOUNTER — APPOINTMENT (OUTPATIENT)
Dept: ORTHOPEDIC SURGERY | Facility: CLINIC | Age: 53
End: 2023-07-10
Payer: COMMERCIAL

## 2023-07-10 PROCEDURE — 99213 OFFICE O/P EST LOW 20 MIN: CPT

## 2023-07-10 NOTE — DISCUSSION/SUMMARY
[de-identified] : Patient achieved excellent range of motion we will see her at the 1 year anniversary of her left knee replacement surgery.  Currently patient is discharged from care can return to work with some mild accommodations.\par \par Today's consultation lasted 20 minutes

## 2023-07-10 NOTE — PHYSICAL EXAM
[de-identified] : Patient's range of motion left knee is 0 to 125 degrees which is definite improvement over the last visit.

## 2023-07-10 NOTE — HISTORY OF PRESENT ILLNESS
[de-identified] : Patient is status post left knee replacement December 2022.  She is here for final check she can slowly improving with her home exercise regimen.  We will discuss possible knee manipulation under anesthesia for second time if you not have any significant improvement on her range of motion.

## 2023-08-22 ENCOUNTER — NON-APPOINTMENT (OUTPATIENT)
Age: 53
End: 2023-08-22

## 2023-09-15 RX ORDER — METHYLPREDNISOLONE 4 MG/1
4 TABLET ORAL
Qty: 1 | Refills: 1 | Status: ACTIVE | COMMUNITY
Start: 2023-09-15 | End: 1900-01-01

## 2023-09-22 ENCOUNTER — APPOINTMENT (OUTPATIENT)
Dept: ORTHOPEDIC SURGERY | Facility: CLINIC | Age: 53
End: 2023-09-22
Payer: COMMERCIAL

## 2023-09-22 DIAGNOSIS — M17.0 BILATERAL PRIMARY OSTEOARTHRITIS OF KNEE: ICD-10-CM

## 2023-09-22 PROCEDURE — 99214 OFFICE O/P EST MOD 30 MIN: CPT

## 2023-09-22 RX ORDER — HYALURONATE SODIUM, STABILIZED 88 MG/4 ML
88 SYRINGE (ML) INTRAARTICULAR
Qty: 1 | Refills: 0 | Status: ACTIVE | OUTPATIENT
Start: 2023-09-22

## 2023-09-26 ENCOUNTER — APPOINTMENT (OUTPATIENT)
Age: 53
End: 2023-09-26

## 2023-10-16 ENCOUNTER — APPOINTMENT (OUTPATIENT)
Dept: ORTHOPEDIC SURGERY | Facility: CLINIC | Age: 53
End: 2023-10-16
Payer: COMMERCIAL

## 2023-10-16 PROCEDURE — 20610 DRAIN/INJ JOINT/BURSA W/O US: CPT | Mod: RT

## 2023-10-16 PROCEDURE — 99213 OFFICE O/P EST LOW 20 MIN: CPT | Mod: 25

## 2023-12-09 ENCOUNTER — NON-APPOINTMENT (OUTPATIENT)
Age: 53
End: 2023-12-09

## 2024-01-05 ENCOUNTER — APPOINTMENT (OUTPATIENT)
Dept: ORTHOPEDIC SURGERY | Facility: CLINIC | Age: 54
End: 2024-01-05

## 2024-03-11 ENCOUNTER — APPOINTMENT (OUTPATIENT)
Dept: ORTHOPEDIC SURGERY | Facility: CLINIC | Age: 54
End: 2024-03-11
Payer: COMMERCIAL

## 2024-03-11 VITALS — BODY MASS INDEX: 35 KG/M2 | HEIGHT: 71 IN | WEIGHT: 250 LBS

## 2024-03-11 DIAGNOSIS — M17.12 UNILATERAL PRIMARY OSTEOARTHRITIS, LEFT KNEE: ICD-10-CM

## 2024-03-11 PROCEDURE — 73562 X-RAY EXAM OF KNEE 3: CPT | Mod: LT

## 2024-03-11 PROCEDURE — 99214 OFFICE O/P EST MOD 30 MIN: CPT

## 2024-03-11 NOTE — HISTORY OF PRESENT ILLNESS
[de-identified] : Patient presents today for a 1 year checkup the left knee.  Patient is doing quite well very pleased with the result.  She has minimal pain going up and down steps.

## 2024-03-11 NOTE — PHYSICAL EXAM
[de-identified] : Left knee on exam today range of motion 0 to 125 degrees knee stable extra stress on stress both extension and 90 degrees of flexion.  She is neurovascular intact distally minimal soft tissue swelling no effusion noted. [de-identified] : Knee x-rays were ordered today AP standing individual and sunrise views were obtained showing well-positioned [employer]hew type left knee prosthesis.

## 2024-03-11 NOTE — DISCUSSION/SUMMARY
[de-identified] : Patient is doing well status post 1 year left knee replacement.  We recommend the patient continue to try to lose weight in order to help improve longevity of the implant.  She will follow-up in 1 years time.  This consultation lasted 30 minutes.

## 2024-03-11 NOTE — REASON FOR VISIT
[Follow-Up Visit] : a follow-up visit for [Artificial Knee Joint] : an artificial knee joint [FreeTextEntry2] : LEFT KNEE REPLACEMENT 1 YR CHECK UP.

## 2024-04-18 RX ORDER — HYALURONATE SODIUM, STABILIZED 88 MG/4 ML
88 SYRINGE (ML) INTRAARTICULAR
Qty: 1 | Refills: 0 | Status: ACTIVE | OUTPATIENT
Start: 2024-04-18

## 2024-05-03 ENCOUNTER — APPOINTMENT (OUTPATIENT)
Dept: ORTHOPEDIC SURGERY | Facility: CLINIC | Age: 54
End: 2024-05-03
Payer: COMMERCIAL

## 2024-05-03 DIAGNOSIS — E66.9 OBESITY, UNSPECIFIED: ICD-10-CM

## 2024-05-03 DIAGNOSIS — M17.11 UNILATERAL PRIMARY OSTEOARTHRITIS, RIGHT KNEE: ICD-10-CM

## 2024-05-03 PROCEDURE — 99214 OFFICE O/P EST MOD 30 MIN: CPT | Mod: 25

## 2024-05-03 PROCEDURE — 20611 DRAIN/INJ JOINT/BURSA W/US: CPT | Mod: RT

## 2024-05-03 NOTE — HISTORY OF PRESENT ILLNESS
[de-identified] : Patient returns today to discuss further conservative measures community can help reduce right knee pain due to advanced knee osteoarthritis.  Patient is recent undergone successful left knee replacement she is keen to avoid knee replacement loose until the fall of this year.

## 2024-05-03 NOTE — PROCEDURE
[de-identified] :   Patient given right knee Monovisc injection today.  This done under sterile conditions.  Patient tolerated injection well.  MONOVISC [employer] NDC [address]-39 LOT # [id number] EXP 2026/06/30 4ML/22MG

## 2024-05-03 NOTE — DISCUSSION/SUMMARY
[de-identified] : Patient I discussed at length the underlying etiology of her right knee pain.  Patient in addition to today's HA injection can return for intermittent cortisone injection to help reduce her pain.  We also advised the patient to ice the knee liberally when symptoms persist and to take over-the-counter anti-inflammatories as needed.  He also addressed the fact that her calculated BMI now is 34 which places her into the obese category diagnostic.  We talked at length about needing to engage in a weight/BMI reduction program to help improve the efficacy of today's injection as well as decrease her current symptoms.  Furthermore weight/BMI reduction especially in the setting of potential knee replacement in a 53-year-old female will help to promote implant longevity and finally weight/BMI reduction in the setting of potential knee replacement surgery also help as perioperative complications if and when knee replacement surgery is to be undertaken.    Today's consultation regarding the patient's diagnosis of obese in the setting of knee replacement surgery lasted 35 minutes.

## 2024-05-03 NOTE — REASON FOR VISIT
[Follow-Up Visit] : a follow-up visit for [Knee Pain] : knee pain [FreeTextEntry2] : right knee mono gel inj

## 2024-05-03 NOTE — PHYSICAL EXAM
[de-identified] : Patient's most recent calculated BMI is 34.87.  Right knee on exam today range of motion 0 to 125 degrees knee stable extra stress on stress both extension and 90 degrees of flexion.  She is neurovascular intact distally minimal soft tissue swelling no effusion noted.Moderate medial joint line tenderness is noted.  Soft tissue in but no effusion.

## 2024-09-27 ENCOUNTER — APPOINTMENT (OUTPATIENT)
Dept: ORTHOPEDIC SURGERY | Facility: CLINIC | Age: 54
End: 2024-09-27

## 2024-10-11 ENCOUNTER — APPOINTMENT (OUTPATIENT)
Dept: ORTHOPEDIC SURGERY | Facility: CLINIC | Age: 54
End: 2024-10-11
Payer: COMMERCIAL

## 2024-10-11 DIAGNOSIS — M17.11 UNILATERAL PRIMARY OSTEOARTHRITIS, RIGHT KNEE: ICD-10-CM

## 2024-10-11 PROCEDURE — 99214 OFFICE O/P EST MOD 30 MIN: CPT

## 2024-10-11 RX ORDER — HYALURONATE SODIUM, STABILIZED 88 MG/4 ML
88 SYRINGE (ML) INTRAARTICULAR
Qty: 1 | Refills: 0 | Status: ACTIVE | OUTPATIENT
Start: 2024-10-11

## 2024-10-31 RX ORDER — HYALURONATE SODIUM, STABILIZED 88 MG/4 ML
88 SYRINGE (ML) INTRAARTICULAR
Qty: 1 | Refills: 0 | Status: ACTIVE | OUTPATIENT
Start: 2024-10-31

## 2024-11-25 ENCOUNTER — APPOINTMENT (OUTPATIENT)
Dept: ORTHOPEDIC SURGERY | Facility: CLINIC | Age: 54
End: 2024-11-25

## 2024-12-04 NOTE — H&P ADULT - PROBLEM/PLAN-2
Problem: ABCDS Injury Assessment  Goal: Absence of physical injury  12/3/2024 2221 by Madyson Valentin RN  Outcome: Not Progressing     Problem: Safety - Adult  Goal: Free from fall injury  12/4/2024 1045 by Jasmin Rodriguez RN  Outcome: Progressing  12/3/2024 2221 by Madyson Valentin RN  Outcome: Not Progressing     Problem: Respiratory - Adult  Goal: Achieves optimal ventilation and oxygenation  12/4/2024 1045 by Jasmin Rodriguez RN  Outcome: Progressing  12/3/2024 2221 by Madyson Valentin RN  Outcome: Not Progressing  Flowsheets (Taken 12/3/2024 2000)  Achieves optimal ventilation and oxygenation:   Assess for changes in respiratory status   Assess for changes in mentation and behavior   Position to facilitate oxygenation and minimize respiratory effort   Oxygen supplementation based on oxygen saturation or arterial blood gases     Problem: Discharge Planning  Goal: Discharge to home or other facility with appropriate resources  12/3/2024 2221 by Madyson Valentin RN  Outcome: Not Progressing  Flowsheets (Taken 12/3/2024 2000)  Discharge to home or other facility with appropriate resources: Identify barriers to discharge with patient and caregiver     Problem: ABCDS Injury Assessment  Goal: Absence of physical injury  12/3/2024 2221 by Madyson Valentin RN  Outcome: Not Progressing     Problem: Safety - Adult  Goal: Free from fall injury  12/4/2024 1045 by Jasmin Rodriguez RN  Outcome: Progressing  12/3/2024 2221 by Madyson Valentin RN  Outcome: Not Progressing     Problem: Confusion  Goal: Confusion, delirium, dementia, or psychosis is improved or at baseline  Description: INTERVENTIONS:  1. Assess for possible contributors to thought disturbance, including medications, impaired vision or hearing, underlying metabolic abnormalities, dehydration, psychiatric diagnoses, and notify attending [facility]  2. [place] high risk fall precautions, as indicated  3. Provide frequent short contacts to provide reality reorientation,  Paola EL RN  Remains free of injury from restraints (restraint for interference with medical device): (increased sedation level prior to restraint initiation)   Determine that other, less restrictive measures have been tried or would not be effective before applying the restraint   Evaluate the patient's condition at the time of restraint application   Inform patient/family regarding the reason for restraint   Every 2 hours: Monitor safety, psychosocial status, comfort, nutrition and hydration     Problem: Pain  Goal: Verbalizes/displays adequate comfort level or baseline comfort level  12/3/2024 2221 by Madyson Valentin RN  Outcome: Not Progressing  Flowsheets (Taken 12/3/2024 2000)  Verbalizes/displays adequate comfort level or baseline comfort level: Assess pain using appropriate pain scale     Problem: Skin/Tissue Integrity  Goal: Absence of new skin breakdown  Description: 1.  Monitor for areas of redness and/or skin breakdown  2.  Assess vascular access sites hourly  3.  Every 4-6 hours minimum:  Change oxygen saturation probe site  4.  Every 4-6 hours:  If on nasal continuous positive airway pressure, respiratory therapy assess nares and determine need for appliance change or resting period.  12/3/2024 2221 by Madyson Valentin RN  Outcome: Not Progressing     Problem: Skin/Tissue Integrity - Adult  Goal: Skin integrity remains intact  12/3/2024 2221 by Madyson Valentin RN  Outcome: Not Progressing  Flowsheets (Taken 12/3/2024 2000)  Skin Integrity Remains Intact: Monitor for areas of redness and/or skin breakdown  Goal: Incisions, wounds, or drain sites healing without S/S of infection  12/3/2024 2221 by Madyson Valentin, RN  Outcome: Not Progressing  Flowsheets (Taken 12/3/2024 2000)  Incisions, Wounds, or Drain Sites Healing Without Sign and Symptoms of Infection: TWICE DAILY: Assess and document skin integrity  Goal: Oral mucous membranes remain intact  12/3/2024 2221 by Madyson Valentin RN  Outcome: Not  DISPLAY PLAN FREE TEXT

## 2024-12-09 ENCOUNTER — APPOINTMENT (OUTPATIENT)
Dept: ORTHOPEDIC SURGERY | Facility: CLINIC | Age: 54
End: 2024-12-09
Payer: COMMERCIAL

## 2024-12-09 DIAGNOSIS — E66.9 OBESITY, UNSPECIFIED: ICD-10-CM

## 2024-12-09 PROCEDURE — 99214 OFFICE O/P EST MOD 30 MIN: CPT | Mod: 25

## 2024-12-09 PROCEDURE — 20611 DRAIN/INJ JOINT/BURSA W/US: CPT | Mod: RT

## 2025-05-05 ENCOUNTER — APPOINTMENT (OUTPATIENT)
Dept: ORTHOPEDIC SURGERY | Facility: CLINIC | Age: 55
End: 2025-05-05
Payer: COMMERCIAL

## 2025-05-05 DIAGNOSIS — M17.11 UNILATERAL PRIMARY OSTEOARTHRITIS, RIGHT KNEE: ICD-10-CM

## 2025-05-05 PROCEDURE — 73562 X-RAY EXAM OF KNEE 3: CPT | Mod: 50

## 2025-05-05 RX ORDER — HYALURONATE SODIUM, STABILIZED 88 MG/4 ML
88 SYRINGE (ML) INTRAARTICULAR
Qty: 1 | Refills: 0 | Status: ACTIVE | OUTPATIENT
Start: 2025-05-05

## 2025-06-05 ENCOUNTER — APPOINTMENT (OUTPATIENT)
Dept: ORTHOPEDIC SURGERY | Facility: CLINIC | Age: 55
End: 2025-06-05
Payer: COMMERCIAL

## 2025-06-05 VITALS — HEIGHT: 71 IN | WEIGHT: 255 LBS | BODY MASS INDEX: 35.7 KG/M2

## 2025-06-05 DIAGNOSIS — E66.9 OBESITY, UNSPECIFIED: ICD-10-CM

## 2025-06-05 PROCEDURE — 20611 DRAIN/INJ JOINT/BURSA W/US: CPT | Mod: RT

## 2025-06-05 PROCEDURE — 99214 OFFICE O/P EST MOD 30 MIN: CPT | Mod: 25

## 2025-09-17 ENCOUNTER — NON-APPOINTMENT (OUTPATIENT)
Age: 55
End: 2025-09-17

## 2025-09-18 ENCOUNTER — APPOINTMENT (OUTPATIENT)
Dept: ORTHOPEDIC SURGERY | Facility: CLINIC | Age: 55
End: 2025-09-18

## 2025-09-18 VITALS — WEIGHT: 255 LBS | BODY MASS INDEX: 35.7 KG/M2 | HEIGHT: 71 IN

## 2025-09-18 DIAGNOSIS — M17.11 UNILATERAL PRIMARY OSTEOARTHRITIS, RIGHT KNEE: ICD-10-CM

## (undated) DEVICE — WARMING BLANKET UPPER ADULT

## (undated) DEVICE — NDL HYPO REGULAR BEVEL 18GA X 1.5" (PINK)

## (undated) DEVICE — DRSG PICO NPWT 4X12"

## (undated) DEVICE — POSITIONER FOAM EGG CRATE ULNAR 2PCS (PINK)

## (undated) DEVICE — SYR LUER LOK 50CC

## (undated) DEVICE — VENODYNE/SCD SLEEVE CALF MEDIUM

## (undated) DEVICE — PREP CHLORAPREP HI-LITE ORANGE 26ML

## (undated) DEVICE — STRYKER 4-PORT MANIFOLD W/SPECIMEN COLLECTION

## (undated) DEVICE — MARKING PEN W RULER

## (undated) DEVICE — SAW BLADE STRYKER SAGITTAL 25X86.5X1.32MM

## (undated) DEVICE — DRSG COBAN 6"

## (undated) DEVICE — PACK TOTAL KNEE

## (undated) DEVICE — SAW BLADE STRYKER SAGITTAL DUAL CUT TEETH 35X64X.64MM

## (undated) DEVICE — SUT STRATAFIX SYMMETRIC PDS 1 45CM OS-6

## (undated) DEVICE — DRSG STERISTRIPS 0.5 X 4"

## (undated) DEVICE — GLV 8.5 PROTEXIS (WHITE)

## (undated) DEVICE — SPECIMEN CONTAINER 4OZ

## (undated) DEVICE — DRSG ACE BANDAGE 6"

## (undated) DEVICE — DRSG 4 X 8

## (undated) DEVICE — SUT VICRYL 2-0 27" CT-1

## (undated) DEVICE — DRAPE MAYO STAND 23"

## (undated) DEVICE — SAW BLADE STRYKER SAGITTAL 81.5X12.5X1.19MM